# Patient Record
Sex: FEMALE | Race: WHITE | NOT HISPANIC OR LATINO | Employment: OTHER | URBAN - METROPOLITAN AREA
[De-identification: names, ages, dates, MRNs, and addresses within clinical notes are randomized per-mention and may not be internally consistent; named-entity substitution may affect disease eponyms.]

---

## 2017-03-07 ENCOUNTER — OFFICE VISIT (OUTPATIENT)
Dept: ENDOCRINOLOGY | Facility: CLINIC | Age: 67
End: 2017-03-07

## 2017-03-07 VITALS
HEART RATE: 78 BPM | OXYGEN SATURATION: 99 % | WEIGHT: 132 LBS | BODY MASS INDEX: 21.99 KG/M2 | SYSTOLIC BLOOD PRESSURE: 104 MMHG | HEIGHT: 65 IN | DIASTOLIC BLOOD PRESSURE: 64 MMHG

## 2017-03-07 DIAGNOSIS — E78.00 PURE HYPERCHOLESTEROLEMIA: Primary | ICD-10-CM

## 2017-03-07 DIAGNOSIS — E03.9 ACQUIRED HYPOTHYROIDISM: ICD-10-CM

## 2017-03-07 DIAGNOSIS — E55.9 VITAMIN D DEFICIENCY: ICD-10-CM

## 2017-03-07 DIAGNOSIS — E83.51 HYPOCALCEMIA: ICD-10-CM

## 2017-03-07 PROBLEM — Z98.890 H/O COLONOSCOPY: Status: ACTIVE | Noted: 2017-03-07

## 2017-03-07 LAB
25(OH)D3 SERPL-MCNC: 49.5 NG/ML
ALBUMIN SERPL-MCNC: 4.4 G/DL (ref 3.2–4.8)
ALBUMIN/GLOB SERPL: 1.8 G/DL (ref 1.5–2.5)
ALP SERPL-CCNC: 104 U/L (ref 25–100)
ALT SERPL W P-5'-P-CCNC: 22 U/L (ref 7–40)
ANION GAP SERPL CALCULATED.3IONS-SCNC: 5 MMOL/L (ref 3–11)
ARTICHOKE IGE QN: 218 MG/DL (ref 0–130)
AST SERPL-CCNC: 24 U/L (ref 0–33)
BILIRUB SERPL-MCNC: 0.6 MG/DL (ref 0.3–1.2)
BUN BLD-MCNC: 19 MG/DL (ref 9–23)
BUN/CREAT SERPL: 23.8 (ref 7–25)
CALCIUM SPEC-SCNC: 10.3 MG/DL (ref 8.7–10.4)
CHLORIDE SERPL-SCNC: 104 MMOL/L (ref 99–109)
CHOLEST SERPL-MCNC: 341 MG/DL (ref 0–200)
CO2 SERPL-SCNC: 30 MMOL/L (ref 20–31)
CREAT BLD-MCNC: 0.8 MG/DL (ref 0.6–1.3)
GFR SERPL CREATININE-BSD FRML MDRD: 72 ML/MIN/1.73
GLOBULIN UR ELPH-MCNC: 2.4 GM/DL
GLUCOSE BLD-MCNC: 96 MG/DL (ref 70–100)
HDLC SERPL-MCNC: 99 MG/DL (ref 40–60)
POTASSIUM BLD-SCNC: 4.7 MMOL/L (ref 3.5–5.5)
PROT SERPL-MCNC: 6.8 G/DL (ref 5.7–8.2)
SODIUM BLD-SCNC: 139 MMOL/L (ref 132–146)
TRIGL SERPL-MCNC: 97 MG/DL (ref 0–150)
TSH SERPL DL<=0.05 MIU/L-ACNC: 0.69 MIU/ML (ref 0.35–5.35)

## 2017-03-07 PROCEDURE — 99213 OFFICE O/P EST LOW 20 MIN: CPT | Performed by: INTERNAL MEDICINE

## 2017-03-07 PROCEDURE — 82306 VITAMIN D 25 HYDROXY: CPT | Performed by: INTERNAL MEDICINE

## 2017-03-07 PROCEDURE — 80061 LIPID PANEL: CPT | Performed by: INTERNAL MEDICINE

## 2017-03-07 PROCEDURE — 80053 COMPREHEN METABOLIC PANEL: CPT | Performed by: INTERNAL MEDICINE

## 2017-03-07 PROCEDURE — 84443 ASSAY THYROID STIM HORMONE: CPT | Performed by: INTERNAL MEDICINE

## 2017-03-07 RX ORDER — ESTRADIOL 0.03 MG/D
1 FILM, EXTENDED RELEASE TRANSDERMAL 2 TIMES WEEKLY
Qty: 8 PATCH | Refills: 11 | Status: SHIPPED | OUTPATIENT
Start: 2017-03-09 | End: 2017-03-07 | Stop reason: SDUPTHER

## 2017-03-07 RX ORDER — ESTRADIOL 10 UG/1
INSERT VAGINAL
Qty: 36 TABLET | Refills: 1 | Status: SHIPPED | OUTPATIENT
Start: 2017-03-07 | End: 2017-09-12 | Stop reason: SDUPTHER

## 2017-03-07 RX ORDER — LEVOTHYROXINE SODIUM 100 MCG
100 TABLET ORAL DAILY
Qty: 90 TABLET | Refills: 1 | Status: SHIPPED | OUTPATIENT
Start: 2017-03-07 | End: 2018-01-05 | Stop reason: SDUPTHER

## 2017-03-07 RX ORDER — TRETINOIN 0.5 MG/G
CREAM TOPICAL
Refills: 11 | COMMUNITY
Start: 2016-12-20

## 2017-03-07 RX ORDER — ESTRADIOL 0.03 MG/D
1 FILM, EXTENDED RELEASE TRANSDERMAL 2 TIMES WEEKLY
Qty: 24 PATCH | Refills: 3 | Status: SHIPPED | OUTPATIENT
Start: 2017-03-09 | End: 2017-03-10 | Stop reason: SDUPTHER

## 2017-03-07 NOTE — PROGRESS NOTES
Beverley Yoo 66 y.o.  CC:Follow-up and Hypothyroidism    [place]: Follow-up and Hypothyroidism    Some dry skin and hair changes - after stopping estrogen   She was allergic to generic alternative    No Known Allergies    Current Outpatient Prescriptions:   •  SYNTHROID 100 MCG tablet, Take 1 tablet by mouth daily for 365 doses. (Patient taking differently: Take 100 mcg by mouth daily. One tablet QOD alternating with 112mcg QOD), Disp: 365 tablet, Rfl: 0  •  tretinoin (RETIN-A) 0.05 % cream, ANALIA AA HS, Disp: , Rfl: 11  •  VAGIFEM 10 MCG tablet vaginal tablet, INSERT 1 TABLET VAGINALLY THREE TIMES A WEEK, Disp: 12 tablet, Rfl: 5  Patient Active Problem List    Diagnosis   • Menopausal symptoms [N95.1]   • Osteopenia [M85.80]   • Seasonal allergies [J30.2]   • Vitamin D deficiency [E55.9]   • Essential familial hypercholesterolemia [E78.01]   • Fibrocystic breast disease [N60.19]     Overview Note:     right periaureolar breast lump- referred 1/13 for diagnostic mamm, abnormal mamm      10/15- repeat  6 months       • Hypercalcemia [E83.52]     Overview Note:     Last Impression: 08 Sep 2015  check ion ca, pth and vitamin D levels  Nela Kenyon (Endocrinology)       • Hyperlipidemia [E78.5]     Overview Note:     Last Impression: 11 Mar 2015  check flp  Nlea Kenyon (Endocrinology)     • Hypothyroidism [E03.9]     Overview Note:      Last Impression: 08 Sep 2015  check tfts  Nela Kenyon (Endocrinology)     • Joint pain [M25.50]     Overview Note:     KNEE AND ELBOW     • Mammogram abnormal [R92.8]     Overview Note:     2/14 abnormal- additional viewsbenign, recc 6 month f/u views  9/14 - continue 6 month      f/u, 10/15 6 month f/u         Review of Systems   Constitutional: Negative for activity change, appetite change, chills, diaphoresis, fatigue, fever and unexpected weight change.   HENT: Negative for congestion, dental problem, drooling, ear discharge, ear pain, facial swelling,  hearing loss, mouth sores, nosebleeds, postnasal drip, rhinorrhea, sinus pressure, sneezing, sore throat, tinnitus, trouble swallowing and voice change.    Eyes: Negative for photophobia, pain, discharge, redness, itching and visual disturbance.   Respiratory: Negative for apnea, cough, choking, chest tightness, shortness of breath, wheezing and stridor.    Cardiovascular: Negative for chest pain, palpitations and leg swelling.   Gastrointestinal: Negative for abdominal distention, abdominal pain, anal bleeding, blood in stool, constipation, diarrhea, nausea, rectal pain and vomiting.   Endocrine: Negative for cold intolerance, heat intolerance, polydipsia, polyphagia and polyuria.   Genitourinary: Negative for decreased urine volume, difficulty urinating, dysuria, enuresis, flank pain, frequency, genital sores, hematuria and urgency.   Musculoskeletal: Negative for arthralgias, back pain, gait problem, joint swelling, myalgias, neck pain and neck stiffness.   Skin: Negative for color change, pallor, rash and wound.        Dry skin   Skin irritation with adhesive   Allergic/Immunologic: Negative for environmental allergies, food allergies and immunocompromised state.   Neurological: Negative for dizziness, tremors, seizures, syncope, facial asymmetry, speech difficulty, weakness, light-headedness, numbness and headaches.   Hematological: Negative for adenopathy. Does not bruise/bleed easily.   Psychiatric/Behavioral: Negative for agitation, behavioral problems, confusion, decreased concentration, dysphoric mood, hallucinations, self-injury, sleep disturbance and suicidal ideas. The patient is not nervous/anxious and is not hyperactive.      Social History     Social History   • Marital status:      Spouse name: N/A   • Number of children: N/A   • Years of education: N/A     Occupational History   • Not on file.     Social History Main Topics   • Smoking status: Never Smoker   • Smokeless tobacco: Not on file  "  • Alcohol use Yes      Comment: 3 drinks per week   • Drug use: Defer   • Sexual activity: Defer     Other Topics Concern   • Not on file     Social History Narrative     Family History   Problem Relation Age of Onset   • Cancer Paternal Grandfather      BREAST    • Breast cancer Mother    • Breast cancer Sister    • Breast cancer Maternal Grandmother      Visit Vitals   • /64   • Pulse 78   • Ht 65\" (165.1 cm)   • Wt 132 lb (59.9 kg)   • SpO2 99%   • BMI 21.97 kg/m2     Physical Exam   Constitutional: She is oriented to person, place, and time. She appears well-developed and well-nourished.   HENT:   Head: Normocephalic and atraumatic.   Nose: Nose normal.   Mouth/Throat: Oropharynx is clear and moist.   Eyes: Conjunctivae, EOM and lids are normal. Pupils are equal, round, and reactive to light.   Neck: Trachea normal and normal range of motion. Neck supple. Carotid bruit is not present. No tracheal deviation present. No thyroid mass and no thyromegaly present.   Cardiovascular: Normal rate, regular rhythm, normal heart sounds and intact distal pulses.  Exam reveals no gallop and no friction rub.    No murmur heard.  Pulmonary/Chest: Effort normal and breath sounds normal. No respiratory distress. She has no wheezes.   Musculoskeletal: Normal range of motion. She exhibits no edema or deformity.   Lymphadenopathy:     She has no cervical adenopathy.   Neurological: She is alert and oriented to person, place, and time. She has normal reflexes. She displays normal reflexes. No cranial nerve deficit.   Skin: Skin is warm and dry. No rash noted. No cyanosis or erythema. Nails show no clubbing.   Psychiatric: She has a normal mood and affect. Her speech is normal and behavior is normal. Judgment and thought content normal. Cognition and memory are normal.   Nursing note and vitals reviewed.    Results for orders placed or performed in visit on 09/15/16   TSH   Result Value Ref Range    TSH 0.281 (L) 0.350 - 5.350 " mIU/mL   T4, free   Result Value Ref Range    Free T4 1.34 0.89 - 1.76 ng/dL   Comprehensive metabolic panel   Result Value Ref Range    Glucose 82 70 - 100 mg/dL    BUN 18 9 - 23 mg/dL    Creatinine 0.80 0.60 - 1.30 mg/dL    Sodium 138 132 - 146 mmol/L    Potassium 4.5 3.5 - 5.5 mmol/L    Chloride 107 99 - 109 mmol/L    CO2 25.0 20.0 - 31.0 mmol/L    Calcium 9.9 8.7 - 10.4 mg/dL    Total Protein 7.2 5.7 - 8.2 g/dL    Albumin 4.40 3.20 - 4.80 g/dL    ALT (SGPT) 20 7 - 40 U/L    AST (SGOT) 22 0 - 33 U/L    Alkaline Phosphatase 82 25 - 100 U/L    Total Bilirubin 0.6 0.3 - 1.2 mg/dL    eGFR Non African Amer 72 >60 mL/min/1.73    Globulin 2.8 gm/dL    A/G Ratio 1.6 g/dL    BUN/Creatinine Ratio 22.5 7.0 - 25.0    Anion Gap 6.0 3.0 - 11.0 mmol/L   Lipid panel   Result Value Ref Range    Total Cholesterol 286 (H) 0 - 200 mg/dL    Triglycerides 124 0 - 150 mg/dL    HDL Cholesterol 87 (H) 40 - 60 mg/dL    LDL Cholesterol  164 (H) 0 - 130 mg/dL   QuantiFERON TB Client Incubated   Result Value Ref Range    QuantiFERON-TB Gold In Tube Negative Negative    QuantiFERON Criteria Comment     QuantiFERON TB Ag Value 0.04 IU/mL    QuantiFERON Nil Value 0.09 IU/mL    QuantiFERON Mitogen Value >10.00 IU/mL    QFT TB Ag minus Nil Value <0.00 IU/mL    Interpretation Comment      Problem List Items Addressed This Visit        Cardiovascular and Mediastinum    Hyperlipidemia - Primary     Check flp          Relevant Orders    Lipid Panel       Digestive    Vitamin D deficiency     Update vitamin D             Endocrine    Hypothyroidism     Check tfts          Relevant Medications    SYNTHROID 100 MCG tablet    Other Relevant Orders    TSH       Other    Hypocalcemia     Check cmp          Relevant Orders    Comprehensive Metabolic Panel    Vitamin D 25 Hydroxy      Return in about 6 months (around 9/7/2017) for Recheck 30 min .      Sheryl Rivas MA

## 2017-03-10 ENCOUNTER — TELEPHONE (OUTPATIENT)
Dept: ENDOCRINOLOGY | Facility: CLINIC | Age: 67
End: 2017-03-10

## 2017-03-10 RX ORDER — ESTRADIOL 0.03 MG/D
1 PATCH, EXTENDED RELEASE TRANSDERMAL 2 TIMES WEEKLY
Qty: 24 PATCH | Refills: 3 | Status: SHIPPED | OUTPATIENT
Start: 2017-03-13 | End: 2018-11-08 | Stop reason: SDUPTHER

## 2017-03-14 ENCOUNTER — TELEPHONE (OUTPATIENT)
Dept: INTERNAL MEDICINE | Facility: CLINIC | Age: 67
End: 2017-03-14

## 2017-03-14 NOTE — TELEPHONE ENCOUNTER
Gaetano with [facility] was informed the pt has to have brand name Vivelle Dot patch because she is allergic to generic - patch - the adhesive causes a rash and itching

## 2017-03-14 NOTE — TELEPHONE ENCOUNTER
----- Message from Beverley Werner sent at 3/14/2017  4:29 PM EDT -----  Contact: JACKI WITH [facility]  SHE IS CALLING WANTING TO SEE IF IT IS OK TO LET PATIENT HAVE THE GENERIC BRAND OF THE VIVELLE DOT PATCH VERSES THE BRAND NAME. GIVING PATIENT THE GENERIC WOULD SAVE THE PATIENT A LOT OF MONEY. SHE NEEDS A  VERBAL AUTHORIZATION TO DISPENSE THE GENERIC FOR VIVELLE DOT PATCH. YOU CAN REACH THEM BACK [phone] WITH REFERENCE NUMBER [id number]

## 2017-05-23 ENCOUNTER — HOSPITAL ENCOUNTER (OUTPATIENT)
Dept: MAMMOGRAPHY | Facility: HOSPITAL | Age: 67
Discharge: HOME | End: 2017-05-23
Attending: INTERNAL MEDICINE | Admitting: INTERNAL MEDICINE

## 2017-05-23 DIAGNOSIS — R92.8 ABNORMAL MAMMOGRAM: ICD-10-CM

## 2017-05-23 PROCEDURE — G0204 DX MAMMO INCL CAD BI: HCPCS | Performed by: RADIOLOGY

## 2017-05-23 PROCEDURE — G0279 TOMOSYNTHESIS, MAMMO: HCPCS

## 2017-05-23 PROCEDURE — G0279 TOMOSYNTHESIS, MAMMO: HCPCS | Performed by: RADIOLOGY

## 2017-05-23 PROCEDURE — G0204 DX MAMMO INCL CAD BI: HCPCS

## 2017-09-12 ENCOUNTER — OFFICE VISIT (OUTPATIENT)
Dept: ENDOCRINOLOGY | Facility: CLINIC | Age: 67
End: 2017-09-12

## 2017-09-12 ENCOUNTER — TELEPHONE (OUTPATIENT)
Dept: ENDOCRINOLOGY | Facility: CLINIC | Age: 67
End: 2017-09-12

## 2017-09-12 VITALS
WEIGHT: 132 LBS | HEART RATE: 64 BPM | HEIGHT: 65 IN | SYSTOLIC BLOOD PRESSURE: 118 MMHG | BODY MASS INDEX: 21.99 KG/M2 | DIASTOLIC BLOOD PRESSURE: 68 MMHG

## 2017-09-12 DIAGNOSIS — E55.9 VITAMIN D DEFICIENCY: ICD-10-CM

## 2017-09-12 DIAGNOSIS — E78.00 PURE HYPERCHOLESTEROLEMIA: ICD-10-CM

## 2017-09-12 DIAGNOSIS — E03.9 ACQUIRED HYPOTHYROIDISM: ICD-10-CM

## 2017-09-12 DIAGNOSIS — E83.52 HYPERCALCEMIA: Primary | ICD-10-CM

## 2017-09-12 DIAGNOSIS — E83.52 HYPERCALCEMIA: ICD-10-CM

## 2017-09-12 DIAGNOSIS — E78.01 ESSENTIAL FAMILIAL HYPERCHOLESTEROLEMIA: Primary | ICD-10-CM

## 2017-09-12 DIAGNOSIS — M85.89 OTHER SPECIFIED DISORDERS OF BONE DENSITY AND STRUCTURE, MULTIPLE SITES: ICD-10-CM

## 2017-09-12 LAB
25(OH)D3 SERPL-MCNC: 36.2 NG/ML
ALBUMIN SERPL-MCNC: 4.5 G/DL (ref 3.2–4.8)
ALBUMIN/GLOB SERPL: 2 G/DL (ref 1.5–2.5)
ALP SERPL-CCNC: 94 U/L (ref 25–100)
ALT SERPL W P-5'-P-CCNC: 21 U/L (ref 7–40)
ANION GAP SERPL CALCULATED.3IONS-SCNC: 9 MMOL/L (ref 3–11)
ARTICHOKE IGE QN: 203 MG/DL (ref 0–130)
AST SERPL-CCNC: 24 U/L (ref 0–33)
BASOPHILS # BLD AUTO: 0.03 10*3/MM3 (ref 0–0.2)
BASOPHILS NFR BLD AUTO: 0.6 % (ref 0–1)
BILIRUB SERPL-MCNC: 0.8 MG/DL (ref 0.3–1.2)
BUN BLD-MCNC: 15 MG/DL (ref 9–23)
BUN/CREAT SERPL: 18.8 (ref 7–25)
CA-I SERPL ISE-MCNC: 1.38 MMOL/L (ref 1.12–1.32)
CALCIUM SPEC-SCNC: 9.9 MG/DL (ref 8.7–10.4)
CHLORIDE SERPL-SCNC: 102 MMOL/L (ref 99–109)
CHOLEST SERPL-MCNC: 343 MG/DL (ref 0–200)
CO2 SERPL-SCNC: 27 MMOL/L (ref 20–31)
CREAT BLD-MCNC: 0.8 MG/DL (ref 0.6–1.3)
DEPRECATED RDW RBC AUTO: 44.6 FL (ref 37–54)
EOSINOPHIL # BLD AUTO: 0.08 10*3/MM3 (ref 0–0.3)
EOSINOPHIL NFR BLD AUTO: 1.7 % (ref 0–3)
ERYTHROCYTE [DISTWIDTH] IN BLOOD BY AUTOMATED COUNT: 12.7 % (ref 11.3–14.5)
GFR SERPL CREATININE-BSD FRML MDRD: 72 ML/MIN/1.73
GLOBULIN UR ELPH-MCNC: 2.3 GM/DL
GLUCOSE BLD-MCNC: 90 MG/DL (ref 70–100)
HCT VFR BLD AUTO: 44.5 % (ref 34.5–44)
HCV AB SER DONR QL: NORMAL
HDLC SERPL-MCNC: 93 MG/DL (ref 40–60)
HGB BLD-MCNC: 14.8 G/DL (ref 11.5–15.5)
IMM GRANULOCYTES # BLD: 0.01 10*3/MM3 (ref 0–0.03)
IMM GRANULOCYTES NFR BLD: 0.2 % (ref 0–0.6)
LYMPHOCYTES # BLD AUTO: 1.64 10*3/MM3 (ref 0.6–4.8)
LYMPHOCYTES NFR BLD AUTO: 35.3 % (ref 24–44)
MCH RBC QN AUTO: 32 PG (ref 27–31)
MCHC RBC AUTO-ENTMCNC: 33.3 G/DL (ref 32–36)
MCV RBC AUTO: 96.3 FL (ref 80–99)
MONOCYTES # BLD AUTO: 0.63 10*3/MM3 (ref 0–1)
MONOCYTES NFR BLD AUTO: 13.5 % (ref 0–12)
NEUTROPHILS # BLD AUTO: 2.26 10*3/MM3 (ref 1.5–8.3)
NEUTROPHILS NFR BLD AUTO: 48.7 % (ref 41–71)
PLATELET # BLD AUTO: 277 10*3/MM3 (ref 150–450)
PMV BLD AUTO: 10.6 FL (ref 6–12)
POTASSIUM BLD-SCNC: 4.7 MMOL/L (ref 3.5–5.5)
PROT SERPL-MCNC: 6.8 G/DL (ref 5.7–8.2)
RBC # BLD AUTO: 4.62 10*6/MM3 (ref 3.89–5.14)
SODIUM BLD-SCNC: 138 MMOL/L (ref 132–146)
T4 FREE SERPL-MCNC: 1.21 NG/DL (ref 0.89–1.76)
TRIGL SERPL-MCNC: 132 MG/DL (ref 0–150)
TSH SERPL DL<=0.05 MIU/L-ACNC: 0.56 MIU/ML (ref 0.35–5.35)
WBC NRBC COR # BLD: 4.65 10*3/MM3 (ref 3.5–10.8)

## 2017-09-12 PROCEDURE — 82330 ASSAY OF CALCIUM: CPT | Performed by: INTERNAL MEDICINE

## 2017-09-12 PROCEDURE — 84443 ASSAY THYROID STIM HORMONE: CPT | Performed by: INTERNAL MEDICINE

## 2017-09-12 PROCEDURE — 36415 COLL VENOUS BLD VENIPUNCTURE: CPT | Performed by: INTERNAL MEDICINE

## 2017-09-12 PROCEDURE — 80053 COMPREHEN METABOLIC PANEL: CPT | Performed by: INTERNAL MEDICINE

## 2017-09-12 PROCEDURE — 86803 HEPATITIS C AB TEST: CPT | Performed by: INTERNAL MEDICINE

## 2017-09-12 PROCEDURE — 84439 ASSAY OF FREE THYROXINE: CPT | Performed by: INTERNAL MEDICINE

## 2017-09-12 PROCEDURE — 99214 OFFICE O/P EST MOD 30 MIN: CPT | Performed by: INTERNAL MEDICINE

## 2017-09-12 PROCEDURE — 90662 IIV NO PRSV INCREASED AG IM: CPT | Performed by: INTERNAL MEDICINE

## 2017-09-12 PROCEDURE — 80061 LIPID PANEL: CPT | Performed by: INTERNAL MEDICINE

## 2017-09-12 PROCEDURE — 86480 TB TEST CELL IMMUN MEASURE: CPT | Performed by: INTERNAL MEDICINE

## 2017-09-12 PROCEDURE — 82306 VITAMIN D 25 HYDROXY: CPT | Performed by: INTERNAL MEDICINE

## 2017-09-12 PROCEDURE — 85025 COMPLETE CBC W/AUTO DIFF WBC: CPT | Performed by: INTERNAL MEDICINE

## 2017-09-12 PROCEDURE — 90471 IMMUNIZATION ADMIN: CPT | Performed by: INTERNAL MEDICINE

## 2017-09-12 RX ORDER — ESTRADIOL 10 UG/1
INSERT VAGINAL
Qty: 48 TABLET | Refills: 1 | Status: SHIPPED | OUTPATIENT
Start: 2017-09-12 | End: 2018-02-27 | Stop reason: SDUPTHER

## 2017-09-12 NOTE — PROGRESS NOTES
Beverley Yoo 67 y.o.  CC:Follow-up; Hypothyroidism; Hyperlipidemia; and Vitamin D Deficiency (Hypercalcemia)    [place]: Follow-up; Hypothyroidism; Hyperlipidemia; and Vitamin D Deficiency (Hypercalcemia)  energy is good overall  No new c/o   Reviewed preventive care  bp is good  Is following a low fat diet  Is taking low dose vitamin D supplement  No muscle cramps or pain     Allergies   Allergen Reactions   • Estradiol      Generic patch causes itching and rash from adhesive   • Other        Current Outpatient Prescriptions:   •  estradiol (VAGIFEM) 10 MCG tablet vaginal tablet, Insert 1 tablet vaginally 2 - 3 times per week, Disp: 48 tablet, Rfl: 1  •  SYNTHROID 100 MCG tablet, Take 1 tablet by mouth Daily for 365 doses., Disp: 90 tablet, Rfl: 1  •  tretinoin (RETIN-A) 0.05 % cream, ANALIA AA HS, Disp: , Rfl: 11  •  VIVELLE-DOT 0.025 MG/24HR patch, Place 1 patch on the skin 2 (Two) Times a Week. Allergic to generic, Disp: 24 patch, Rfl: 3  Patient Active Problem List    Diagnosis   • H/O colonoscopy [Z98.890]     Overview Note:     Description: diverticulosis- Gem 5/12 repeat 10 years     • Hypocalcemia [E83.51]     Overview Note:     Impression: 03/11/2015 - check cmp  Impression: 09/10/2014 - check ion ca and cmp  is taking ca supplement  normal D levels last visit;      • Menopausal symptoms [N95.1]   • Osteopenia [M85.80]     Overview Note:     Impression: 09/08/2015 - utd with exam- repeat 2016; Description: 2/14- on calcium and vitamin D - repeat in 2 years.  frax score stable     • Seasonal allergies [J30.2]     Overview Note:     Impression: 09/08/2015 - recc [place], otc nasalcort/claritin/etc  recent poor air quality likely culprit;      • Vitamin D deficiency [E55.9]     Overview Note:     Impression: 09/08/2015 - check levels  Impression: 03/11/2015 - update vitamin D  Impression: 03/05/2014 - update vitamin D;      • Essential familial hypercholesterolemia [E78.01]     Overview Note:     Impression:  09/08/2015 - check flp  high ldl but good ratio due to high hdl;      • Fibrocystic breast disease [N60.19]     Overview Note:     right periaureolar breast lump- referred 1/13 for diagnostic mamm, abnormal mamm      10/15- repeat  6 months       • Hypercalcemia [E83.52]     Overview Note:     Last Impression: 08 Sep 2015  check ion ca, pth and vitamin D levels  Nela Kenyon (Endocrinology)       • Hyperlipidemia [E78.5]     Overview Note:     Last Impression: 11 Mar 2015  check flp  Nela Kenyon (Endocrinology)     • Hypothyroidism [E03.9]     Overview Note:      Last Impression: 08 Sep 2015  check tfts  Nela Kenyon (Endocrinology)     • Knee pain [M25.569]     Overview Note:     KNEE AND ELBOW     • Mammogram abnormal [R92.8]     Overview Note:     2/14 abnormal- additional viewsbenign, recc 6 month f/u views  9/14 - continue 6 month      f/u, 10/15 6 month f/u         Review of Systems   Constitutional: Negative for activity change, appetite change, chills, diaphoresis, fatigue, fever and unexpected weight change.   HENT: Negative for congestion, dental problem, drooling, ear discharge, ear pain, facial swelling, hearing loss, mouth sores, nosebleeds, postnasal drip, rhinorrhea, sinus pressure, sneezing, sore throat, tinnitus, trouble swallowing and voice change.    Eyes: Negative for photophobia, pain, discharge, redness, itching and visual disturbance.   Respiratory: Negative for apnea, cough, choking, chest tightness, shortness of breath, wheezing and stridor.    Cardiovascular: Negative for chest pain, palpitations and leg swelling.   Gastrointestinal: Negative for abdominal distention, abdominal pain, anal bleeding, blood in stool, constipation, diarrhea, nausea, rectal pain and vomiting.   Endocrine: Negative for cold intolerance, heat intolerance, polydipsia, polyphagia and polyuria.   Genitourinary: Negative for decreased urine volume, difficulty urinating, dysuria,  "enuresis, flank pain, frequency, genital sores, hematuria and urgency.   Musculoskeletal: Negative for arthralgias, back pain, gait problem, joint swelling, myalgias, neck pain and neck stiffness.   Skin: Negative for color change, pallor, rash and wound.   Allergic/Immunologic: Negative for environmental allergies, food allergies and immunocompromised state.   Neurological: Negative for dizziness, tremors, seizures, syncope, facial asymmetry, speech difficulty, weakness, light-headedness, numbness and headaches.   Hematological: Negative for adenopathy. Does not bruise/bleed easily.   Psychiatric/Behavioral: Negative for agitation, behavioral problems, confusion, decreased concentration, dysphoric mood, hallucinations, self-injury, sleep disturbance and suicidal ideas. The patient is not nervous/anxious and is not hyperactive.      Social History     Social History   • Marital status:      Spouse name: N/A   • Number of children: N/A   • Years of education: N/A     Occupational History   • Not on file.     Social History Main Topics   • Smoking status: Never Smoker   • Smokeless tobacco: Not on file   • Alcohol use Yes      Comment: 3 drinks per week   • Drug use: Defer   • Sexual activity: Defer     Other Topics Concern   • Not on file     Social History Narrative     Family History   Problem Relation Age of Onset   • Cancer Paternal Grandfather      BREAST    • Breast cancer Mother 60   • Breast cancer Sister 60   • Breast cancer Maternal Grandmother 60     /68  Pulse 64  Ht 65\" (165.1 cm)  Wt 132 lb (59.9 kg)  BMI 21.97 kg/m2  Physical Exam   Constitutional: She is oriented to person, place, and time. She appears well-developed and well-nourished.   HENT:   Head: Normocephalic and atraumatic.   Nose: Nose normal.   Mouth/Throat: Oropharynx is clear and moist.   Eyes: Conjunctivae, EOM and lids are normal. Pupils are equal, round, and reactive to light.   Neck: Trachea normal and normal range of " motion. Neck supple. Carotid bruit is not present. No tracheal deviation present. No thyroid mass and no thyromegaly present.   Cardiovascular: Normal rate, regular rhythm, normal heart sounds and intact distal pulses.  Exam reveals no gallop and no friction rub.    No murmur heard.  Pulmonary/Chest: Effort normal and breath sounds normal. No respiratory distress. She has no wheezes.   Musculoskeletal: Normal range of motion. She exhibits no edema or deformity.   Lymphadenopathy:     She has no cervical adenopathy.   Neurological: She is alert and oriented to person, place, and time. She has normal reflexes. She displays normal reflexes. No cranial nerve deficit.   Skin: Skin is warm and dry. No rash noted. No cyanosis or erythema. Nails show no clubbing.   Psychiatric: She has a normal mood and affect. Her speech is normal and behavior is normal. Judgment and thought content normal. Cognition and memory are normal.   Nursing note and vitals reviewed.    Results for orders placed or performed in visit on 03/07/17   TSH   Result Value Ref Range    TSH 0.685 0.350 - 5.350 mIU/mL   Comprehensive Metabolic Panel   Result Value Ref Range    Glucose 96 70 - 100 mg/dL    BUN 19 9 - 23 mg/dL    Creatinine 0.80 0.60 - 1.30 mg/dL    Sodium 139 132 - 146 mmol/L    Potassium 4.7 3.5 - 5.5 mmol/L    Chloride 104 99 - 109 mmol/L    CO2 30.0 20.0 - 31.0 mmol/L    Calcium 10.3 8.7 - 10.4 mg/dL    Total Protein 6.8 5.7 - 8.2 g/dL    Albumin 4.40 3.20 - 4.80 g/dL    ALT (SGPT) 22 7 - 40 U/L    AST (SGOT) 24 0 - 33 U/L    Alkaline Phosphatase 104 (H) 25 - 100 U/L    Total Bilirubin 0.6 0.3 - 1.2 mg/dL    eGFR Non African Amer 72 >60 mL/min/1.73    Globulin 2.4 gm/dL    A/G Ratio 1.8 1.5 - 2.5 g/dL    BUN/Creatinine Ratio 23.8 7.0 - 25.0    Anion Gap 5.0 3.0 - 11.0 mmol/L   Lipid Panel   Result Value Ref Range    Total Cholesterol 341 (H) 0 - 200 mg/dL    Triglycerides 97 0 - 150 mg/dL    HDL Cholesterol 99 (H) 40 - 60 mg/dL    LDL  Cholesterol  218 (H) 0 - 130 mg/dL   Vitamin D 25 Hydroxy   Result Value Ref Range    25 Hydroxy, Vitamin D 49.5 ng/ml     Problem List Items Addressed This Visit        Cardiovascular and Mediastinum    Hyperlipidemia     Check flp          Relevant Orders    Lipid Panel    QuantiFERON TB Gold    RESOLVED: Essential familial hypercholesterolemia - Primary    Relevant Medications    pneumococcal polysaccharide 23-valent (PNEUMOVAX-23) vaccine 0.5 mL    Other Relevant Orders    Comprehensive Metabolic Panel    CBC & Differential    QuantiFERON TB Gold    Hepatitis C Antibody       Digestive    Vitamin D deficiency     Update vitamin D levels          Relevant Orders    Lipid Panel    QuantiFERON TB Gold    Hepatitis C Antibody       Endocrine    Hypothyroidism     Check tfts          Relevant Orders    TSH    T4, Free    CBC & Differential    QuantiFERON TB Gold    Hepatitis C Antibody       Other    Hypercalcemia     Check ion ca and vitamin D          Relevant Orders    Vitamin D 25 Hydroxy    Calcium, Ionized    QuantiFERON TB Gold      Other Visit Diagnoses     Other specified disorders of bone density and structure, multiple sites        Relevant Orders    DEXA Bone Density Axial      utd mamm - yearly f/u   Had tetanus 5 years ago   prevnar today   Return in about 6 months (around 3/12/2018) for Recheck 30 min .      Sheryl Rivas MA  Signed Nela Kenyon MD

## 2017-09-18 LAB
INTERPRETATION: NORMAL
M TB TUBERC IFN-G BLD QL: NEGATIVE
QFT TB AG MINUS NIL VALUE: 0.01 IU/ML
QUANTIFERON CRITERIA: NORMAL
QUANTIFERON MITOGEN VALUE: >10 IU/ML
QUANTIFERON NIL VALUE: 0.05 IU/ML
QUANTIFERON TB AG VALUE: 0.06 IU/ML

## 2017-10-18 ENCOUNTER — HOSPITAL ENCOUNTER (OUTPATIENT)
Dept: BONE DENSITY | Facility: HOSPITAL | Age: 67
Discharge: HOME | End: 2017-10-18
Attending: INTERNAL MEDICINE | Admitting: INTERNAL MEDICINE

## 2017-10-18 PROCEDURE — 77080 DXA BONE DENSITY AXIAL: CPT

## 2018-01-02 RX ORDER — ESTRADIOL 0.03 MG/D
PATCH, EXTENDED RELEASE TRANSDERMAL
Qty: 24 PATCH | Refills: 3 | Status: SHIPPED | OUTPATIENT
Start: 2018-01-02 | End: 2018-05-15 | Stop reason: SDUPTHER

## 2018-01-05 RX ORDER — LEVOTHYROXINE SODIUM 100 MCG
TABLET ORAL
Qty: 90 TABLET | Refills: 1 | Status: SHIPPED | OUTPATIENT
Start: 2018-01-05 | End: 2018-05-15 | Stop reason: SDUPTHER

## 2018-02-27 RX ORDER — ESTRADIOL 10 UG/1
TABLET VAGINAL
Qty: 48 TABLET | Refills: 1 | Status: SHIPPED | OUTPATIENT
Start: 2018-02-27 | End: 2019-01-22 | Stop reason: SDUPTHER

## 2018-03-19 ENCOUNTER — TRANSCRIBE ORDERS (OUTPATIENT)
Dept: ADMINISTRATIVE | Facility: HOSPITAL | Age: 68
End: 2018-03-19

## 2018-03-19 DIAGNOSIS — Z12.31 VISIT FOR SCREENING MAMMOGRAM: Primary | ICD-10-CM

## 2018-05-15 ENCOUNTER — OFFICE VISIT (OUTPATIENT)
Dept: ENDOCRINOLOGY | Facility: CLINIC | Age: 68
End: 2018-05-15

## 2018-05-15 VITALS
HEIGHT: 65 IN | BODY MASS INDEX: 21.99 KG/M2 | WEIGHT: 132 LBS | SYSTOLIC BLOOD PRESSURE: 118 MMHG | DIASTOLIC BLOOD PRESSURE: 68 MMHG

## 2018-05-15 DIAGNOSIS — E78.00 PURE HYPERCHOLESTEROLEMIA: Primary | ICD-10-CM

## 2018-05-15 DIAGNOSIS — M21.612 BUNION OF LEFT FOOT: ICD-10-CM

## 2018-05-15 DIAGNOSIS — E55.9 VITAMIN D DEFICIENCY: ICD-10-CM

## 2018-05-15 DIAGNOSIS — E03.9 ACQUIRED HYPOTHYROIDISM: ICD-10-CM

## 2018-05-15 DIAGNOSIS — E83.52 HYPERCALCEMIA: ICD-10-CM

## 2018-05-15 LAB
25(OH)D3 SERPL-MCNC: 41.8 NG/ML
ALBUMIN SERPL-MCNC: 4.3 G/DL (ref 3.2–4.8)
ALBUMIN/GLOB SERPL: 1.7 G/DL (ref 1.5–2.5)
ALP SERPL-CCNC: 75 U/L (ref 25–100)
ALT SERPL W P-5'-P-CCNC: 20 U/L (ref 7–40)
ANION GAP SERPL CALCULATED.3IONS-SCNC: 4 MMOL/L (ref 3–11)
ARTICHOKE IGE QN: 168 MG/DL (ref 0–130)
AST SERPL-CCNC: 24 U/L (ref 0–33)
BILIRUB SERPL-MCNC: 0.8 MG/DL (ref 0.3–1.2)
BUN BLD-MCNC: 19 MG/DL (ref 9–23)
BUN/CREAT SERPL: 23.8 (ref 7–25)
CA-I SERPL ISE-MCNC: 1.37 MMOL/L (ref 1.12–1.32)
CALCIUM SPEC-SCNC: 9.4 MG/DL (ref 8.7–10.4)
CHLORIDE SERPL-SCNC: 108 MMOL/L (ref 99–109)
CHOLEST SERPL-MCNC: 288 MG/DL (ref 0–200)
CO2 SERPL-SCNC: 29 MMOL/L (ref 20–31)
CREAT BLD-MCNC: 0.8 MG/DL (ref 0.6–1.3)
GFR SERPL CREATININE-BSD FRML MDRD: 72 ML/MIN/1.73
GLOBULIN UR ELPH-MCNC: 2.5 GM/DL
GLUCOSE BLD-MCNC: 93 MG/DL (ref 70–100)
HDLC SERPL-MCNC: 95 MG/DL (ref 40–60)
POTASSIUM BLD-SCNC: 4.6 MMOL/L (ref 3.5–5.5)
PROT SERPL-MCNC: 6.8 G/DL (ref 5.7–8.2)
PTH-INTACT SERPL-MCNC: 87.2 PG/ML (ref 11–80)
SODIUM BLD-SCNC: 141 MMOL/L (ref 132–146)
T4 FREE SERPL-MCNC: 1.28 NG/DL (ref 0.89–1.76)
TRIGL SERPL-MCNC: 108 MG/DL (ref 0–150)
TSH SERPL DL<=0.05 MIU/L-ACNC: 0.53 MIU/ML (ref 0.35–5.35)
VIT B12 BLD-MCNC: 798 PG/ML (ref 211–911)

## 2018-05-15 PROCEDURE — 99214 OFFICE O/P EST MOD 30 MIN: CPT | Performed by: INTERNAL MEDICINE

## 2018-05-15 PROCEDURE — 84443 ASSAY THYROID STIM HORMONE: CPT | Performed by: INTERNAL MEDICINE

## 2018-05-15 PROCEDURE — 80061 LIPID PANEL: CPT | Performed by: INTERNAL MEDICINE

## 2018-05-15 PROCEDURE — 82330 ASSAY OF CALCIUM: CPT | Performed by: INTERNAL MEDICINE

## 2018-05-15 PROCEDURE — 83970 ASSAY OF PARATHORMONE: CPT | Performed by: INTERNAL MEDICINE

## 2018-05-15 PROCEDURE — 82306 VITAMIN D 25 HYDROXY: CPT | Performed by: INTERNAL MEDICINE

## 2018-05-15 PROCEDURE — 80053 COMPREHEN METABOLIC PANEL: CPT | Performed by: INTERNAL MEDICINE

## 2018-05-15 PROCEDURE — 82607 VITAMIN B-12: CPT | Performed by: INTERNAL MEDICINE

## 2018-05-15 PROCEDURE — 84439 ASSAY OF FREE THYROXINE: CPT | Performed by: INTERNAL MEDICINE

## 2018-05-15 RX ORDER — LEVOTHYROXINE SODIUM 100 MCG
100 TABLET ORAL DAILY
Qty: 90 TABLET | Refills: 1 | Status: SHIPPED | OUTPATIENT
Start: 2018-05-15 | End: 2018-11-11 | Stop reason: SDUPTHER

## 2018-05-15 NOTE — PROGRESS NOTES
Beverley Yoo 67 y.o.  CC:Follow-up; Hypothyroidism; Hyperlipidemia; Vitamin D Deficiency; Abnormal Calcium; and bilateral feet areas of pain    [facility]: Follow-up; Hypothyroidism; Hyperlipidemia; Vitamin D Deficiency; Abnormal Calcium; and bilateral feet areas of pain    Is on synthroid 100 mcg daily - energy is good   Is on low fat diet   Calcium level high last ov with  Normal PTH and vitamin D  Right breast tenderness- has mamm  June 4th- she is still on patch- cannot feel lump   Also c/o foot pain - knots on sole of foot   Bunion is painful   Discussed getting opinion from foot and ankle specialist     Allergies   Allergen Reactions   • Estradiol      Generic patch causes itching and rash from adhesive   • Other        Current Outpatient Prescriptions:   •  SYNTHROID 100 MCG tablet, Take 1 tablet by mouth Daily., Disp: 90 tablet, Rfl: 1  •  tretinoin (RETIN-A) 0.05 % cream, ANALIA AA HS, Disp: , Rfl: 11  •  VIVELLE-DOT 0.025 MG/24HR patch, Place 1 patch on the skin 2 (Two) Times a Week. Allergic to generic, Disp: 24 patch, Rfl: 3  •  YUVAFEM 10 MCG tablet vaginal tablet, INSERT 1 TABLET VAGINALLY 2 TO 3 TIMES PER WEEK, Disp: 48 tablet, Rfl: 1  Patient Active Problem List    Diagnosis   • H/O colonoscopy [Z98.890]     Overview Note:     Description: diverticulosis- Gem 5/12 repeat 10 years     • Hypocalcemia [E83.51]     Overview Note:     Impression: 03/11/2015 - check cmp  Impression: 09/10/2014 - check ion ca and cmp  is taking ca supplement  normal D levels last visit;      • Menopausal symptoms [N95.1]   • Osteopenia [M85.80]     Overview Note:     Impression: 09/08/2015 - utd with exam- repeat 2016; Description: 2/14- on calcium and vitamin D - repeat in 2 years.  frax score stable     • Seasonal allergies [J30.2]     Overview Note:     Impression: 09/08/2015 - recc ocean ns, otc nasalcort/claritin/etc  recent poor air quality likely culprit;      • Vitamin D deficiency [E55.9]     Overview Note:     Impression:  09/08/2015 - check levels  Impression: 03/11/2015 - update vitamin D  Impression: 03/05/2014 - update vitamin D;      • Fibrocystic breast disease [N60.19]     Overview Note:     right periaureolar breast lump- referred 1/13 for diagnostic mamm, abnormal mamm      10/15- repeat  6 months       • Hypercalcemia [E83.52]     Overview Note:     Last Impression: 08 Sep 2015  check ion ca, pth and vitamin D levels  Nela Kenyon (Endocrinology)       • Hyperlipidemia [E78.5]     Overview Note:     Last Impression: 11 Mar 2015  check flp  Nela Kenyon (Endocrinology)     • Hypothyroidism [E03.9]     Overview Note:      Last Impression: 08 Sep 2015  check tfts  Nela Kenyon (Endocrinology)     • Knee pain [M25.569]     Overview Note:     KNEE AND ELBOW     • Mammogram abnormal [R92.8]     Overview Note:     2/14 abnormal- additional viewsbenign, recc 6 month f/u views  9/14 - continue 6 month      f/u, 10/15 6 month f/u         Review of Systems   Constitutional: Negative for activity change, appetite change, chills, diaphoresis, fatigue, fever and unexpected weight change.   HENT: Negative for congestion, dental problem, drooling, ear discharge, ear pain, facial swelling, hearing loss, mouth sores, nosebleeds, postnasal drip, rhinorrhea, sinus pressure, sneezing, sore throat, tinnitus, trouble swallowing and voice change.    Eyes: Negative for photophobia, pain, discharge, redness, itching and visual disturbance.   Respiratory: Negative for apnea, cough, choking, chest tightness, shortness of breath, wheezing and stridor.    Cardiovascular: Negative for chest pain, palpitations and leg swelling.   Gastrointestinal: Negative for abdominal distention, abdominal pain, anal bleeding, blood in stool, constipation, diarrhea, nausea, rectal pain and vomiting.   Endocrine: Negative for cold intolerance, heat intolerance, polydipsia, polyphagia and polyuria.   Genitourinary: Negative for decreased  "urine volume, difficulty urinating, dysuria, enuresis, flank pain, frequency, genital sores, hematuria and urgency.   Musculoskeletal: Positive for arthralgias. Negative for back pain, gait problem, joint swelling, myalgias, neck pain and neck stiffness.   Skin: Negative for color change, pallor, rash and wound.   Allergic/Immunologic: Negative for environmental allergies, food allergies and immunocompromised state.   Neurological: Negative for dizziness, tremors, seizures, syncope, facial asymmetry, speech difficulty, weakness, light-headedness, numbness and headaches.   Hematological: Negative for adenopathy. Does not bruise/bleed easily.   Psychiatric/Behavioral: Negative for agitation, behavioral problems, confusion, decreased concentration, dysphoric mood, hallucinations, self-injury, sleep disturbance and suicidal ideas. The patient is not nervous/anxious and is not hyperactive.      Social History     Social History   • Marital status:      Spouse name: N/A   • Number of children: N/A   • Years of education: N/A     Occupational History   • Not on file.     Social History Main Topics   • Smoking status: Never Smoker   • Smokeless tobacco: Never Used   • Alcohol use Yes      Comment: 1 glass of wine 5 days per week   • Drug use: Unknown   • Sexual activity: Defer     Other Topics Concern   • Not on file     Social History Narrative   • No narrative on file     Family History   Problem Relation Age of Onset   • Cancer Paternal Grandfather      BREAST    • Breast cancer Mother 60   • Breast cancer Sister 60   • Breast cancer Maternal Grandmother 60     /69   Ht 163.8 cm (64.5\")   Wt 59.9 kg (132 lb)   BMI 22.31 kg/m²   Physical Exam   Constitutional: She is oriented to person, place, and time. She appears well-developed and well-nourished.   HENT:   Head: Normocephalic and atraumatic.   Nose: Nose normal.   Mouth/Throat: Oropharynx is clear and moist.   Eyes: Conjunctivae, EOM and lids are " normal. Pupils are equal, round, and reactive to light.   Neck: Trachea normal and normal range of motion. Neck supple. Carotid bruit is not present. No tracheal deviation present. No thyroid mass and no thyromegaly present.   Cardiovascular: Normal rate, regular rhythm, normal heart sounds and intact distal pulses.  Exam reveals no gallop and no friction rub.    No murmur heard.  Pulmonary/Chest: Effort normal and breath sounds normal. No respiratory distress. She has no wheezes.   Musculoskeletal: Normal range of motion. She exhibits no edema or deformity.   Right gt toe bunion    Lymphadenopathy:     She has no cervical adenopathy.   Neurological: She is alert and oriented to person, place, and time. She has normal reflexes. She displays normal reflexes. No cranial nerve deficit.   Skin: Skin is warm and dry. No rash noted. No cyanosis or erythema. Nails show no clubbing.   Psychiatric: She has a normal mood and affect. Her speech is normal and behavior is normal. Judgment and thought content normal. Cognition and memory are normal.   Nursing note and vitals reviewed.    Results for orders placed or performed in visit on 09/12/17   TSH   Result Value Ref Range    TSH 0.556 0.350 - 5.350 mIU/mL   T4, Free   Result Value Ref Range    Free T4 1.21 0.89 - 1.76 ng/dL   Comprehensive Metabolic Panel   Result Value Ref Range    Glucose 90 70 - 100 mg/dL    BUN 15 9 - 23 mg/dL    Creatinine 0.80 0.60 - 1.30 mg/dL    Sodium 138 132 - 146 mmol/L    Potassium 4.7 3.5 - 5.5 mmol/L    Chloride 102 99 - 109 mmol/L    CO2 27.0 20.0 - 31.0 mmol/L    Calcium 9.9 8.7 - 10.4 mg/dL    Total Protein 6.8 5.7 - 8.2 g/dL    Albumin 4.50 3.20 - 4.80 g/dL    ALT (SGPT) 21 7 - 40 U/L    AST (SGOT) 24 0 - 33 U/L    Alkaline Phosphatase 94 25 - 100 U/L    Total Bilirubin 0.8 0.3 - 1.2 mg/dL    eGFR Non African Amer 72 >60 mL/min/1.73    Globulin 2.3 gm/dL    A/G Ratio 2.0 1.5 - 2.5 g/dL    BUN/Creatinine Ratio 18.8 7.0 - 25.0    Anion Gap  9.0 3.0 - 11.0 mmol/L   Lipid Panel   Result Value Ref Range    Total Cholesterol 343 (H) 0 - 200 mg/dL    Triglycerides 132 0 - 150 mg/dL    HDL Cholesterol 93 (H) 40 - 60 mg/dL    LDL Cholesterol  203 (H) 0 - 130 mg/dL   Vitamin D 25 Hydroxy   Result Value Ref Range    25 Hydroxy, Vitamin D 36.2 ng/ml   Calcium, Ionized   Result Value Ref Range    Ionized Calcium 1.38 (H) 1.12 - 1.32 mmol/L   Hepatitis C Antibody   Result Value Ref Range    Hepatitis C Ab Non-Reactive Non-Reactive   CBC Auto Differential   Result Value Ref Range    WBC 4.65 3.50 - 10.80 10*3/mm3    RBC 4.62 3.89 - 5.14 10*6/mm3    Hemoglobin 14.8 11.5 - 15.5 g/dL    Hematocrit 44.5 (H) 34.5 - 44.0 %    MCV 96.3 80.0 - 99.0 fL    MCH 32.0 (H) 27.0 - 31.0 pg    MCHC 33.3 32.0 - 36.0 g/dL    RDW 12.7 11.3 - 14.5 %    RDW-SD 44.6 37.0 - 54.0 fl    MPV 10.6 6.0 - 12.0 fL    Platelets 277 150 - 450 10*3/mm3    Neutrophil % 48.7 41.0 - 71.0 %    Lymphocyte % 35.3 24.0 - 44.0 %    Monocyte % 13.5 (H) 0.0 - 12.0 %    Eosinophil % 1.7 0.0 - 3.0 %    Basophil % 0.6 0.0 - 1.0 %    Immature Grans % 0.2 0.0 - 0.6 %    Neutrophils, Absolute 2.26 1.50 - 8.30 10*3/mm3    Lymphocytes, Absolute 1.64 0.60 - 4.80 10*3/mm3    Monocytes, Absolute 0.63 0.00 - 1.00 10*3/mm3    Eosinophils, Absolute 0.08 0.00 - 0.30 10*3/mm3    Basophils, Absolute 0.03 0.00 - 0.20 10*3/mm3    Immature Grans, Absolute 0.01 0.00 - 0.03 10*3/mm3   QuantiFERON TB Client Incubated   Result Value Ref Range    QuantiFERON-TB Gold In Tube Negative Negative    QuantiFERON Criteria Comment     QuantiFERON TB Ag Value 0.06 IU/mL    QuantiFERON Nil Value 0.05 IU/mL    QuantiFERON Mitogen Value >10.00 IU/mL    QFT TB Ag minus Nil Value 0.01 IU/mL    Interpretation Comment      Problem List Items Addressed This Visit        Cardiovascular and Mediastinum    Hyperlipidemia - Primary     Check flp          Relevant Orders    Comprehensive Metabolic Panel    Lipid Panel    Vitamin B12    QuantiFERON TB  Gold       Digestive    Vitamin D deficiency     Update vitamin D level          Relevant Orders    QuantiFERON TB Gold       Endocrine    Hypothyroidism     Check tft          Relevant Medications    SYNTHROID 100 MCG tablet    Other Relevant Orders    TSH    T4, Free    QuantiFERON TB Gold       Musculoskeletal and Integument    Bunion of left foot     Refer opinion ortho          Relevant Orders    Vitamin B12    Ambulatory Referral to Orthopedic Surgery       Other    Hypercalcemia     Check ion ca and vitamin D, PTH          Relevant Orders    Calcium, Ionized    PTH, Intact    Vitamin D 25 Hydroxy      Other Visit Diagnoses    None.       Return in about 6 months (around 11/15/2018) for Recheck 30 min .    Sheryl Rivas MA  Signed Nela Kenyon MD

## 2018-05-22 PROBLEM — E21.0 PRIMARY HYPERPARATHYROIDISM: Status: ACTIVE | Noted: 2018-05-22

## 2018-06-04 ENCOUNTER — HOSPITAL ENCOUNTER (OUTPATIENT)
Dept: MAMMOGRAPHY | Facility: HOSPITAL | Age: 68
Discharge: HOME | End: 2018-06-04
Attending: INTERNAL MEDICINE | Admitting: INTERNAL MEDICINE

## 2018-06-04 DIAGNOSIS — Z12.31 VISIT FOR SCREENING MAMMOGRAM: ICD-10-CM

## 2018-06-04 PROCEDURE — 77063 BREAST TOMOSYNTHESIS BI: CPT

## 2018-06-04 PROCEDURE — 77067 SCR MAMMO BI INCL CAD: CPT | Performed by: RADIOLOGY

## 2018-06-04 PROCEDURE — 77067 SCR MAMMO BI INCL CAD: CPT

## 2018-06-04 PROCEDURE — 77063 BREAST TOMOSYNTHESIS BI: CPT | Performed by: RADIOLOGY

## 2018-06-20 ENCOUNTER — OFFICE VISIT (OUTPATIENT)
Dept: ORTHOPEDIC SURGERY | Facility: CLINIC | Age: 68
End: 2018-06-20

## 2018-06-20 VITALS — WEIGHT: 126.32 LBS | HEIGHT: 65 IN | OXYGEN SATURATION: 96 % | HEART RATE: 80 BPM | BODY MASS INDEX: 21.05 KG/M2

## 2018-06-20 DIAGNOSIS — M72.2 PLANTAR FASCIAL FIBROMATOSIS OF RIGHT FOOT: ICD-10-CM

## 2018-06-20 DIAGNOSIS — M20.22 ACQUIRED HALLUX RIGIDUS OF LEFT FOOT: ICD-10-CM

## 2018-06-20 DIAGNOSIS — M79.672 LEFT FOOT PAIN: Primary | ICD-10-CM

## 2018-06-20 PROCEDURE — 99203 OFFICE O/P NEW LOW 30 MIN: CPT | Performed by: ORTHOPAEDIC SURGERY

## 2018-06-20 NOTE — PROGRESS NOTES
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  She has a 2-3 year history of increasing pain in the left great toe.  She notes that the toe has gotten bigger and stiffer.  She is wondering if it is a bunion.  She does not have any family history of similar problems.  She does not remember any injury to the great toe.  She is very active.  She reports that the toe does not stop her activity, but she wants to know what to do with it to help keep it from getting worse and to make it more comfortable.  She also has a history of a plantar fibroma on the right foot, and had one on the left foot previously, she reports they do not hurt at present.  She has had custom orthotics in the past for the fibromas, she has them in her athletic shoes.  The left one does not have a turf toe plate.    Current Outpatient Prescriptions on File Prior to Visit   Medication Sig Dispense Refill   • SYNTHROID 100 MCG tablet Take 1 tablet by mouth Daily. 90 tablet 1   • tretinoin (RETIN-A) 0.05 % cream ANALIA AA HS  11   • VIVELLE-DOT 0.025 MG/24HR patch Place 1 patch on the skin 2 (Two) Times a Week. Allergic to generic 24 patch 3   • YUVAFEM 10 MCG tablet vaginal tablet INSERT 1 TABLET VAGINALLY 2 TO 3 TIMES PER WEEK 48 tablet 1     No current facility-administered medications on file prior to visit.       Allergies   Allergen Reactions   • Estradiol      Generic patch causes itching and rash from adhesive   • Levothyroxine Other (See Comments)     Ineffective     • Other       Past Medical History:   Diagnosis Date   • Encounter for screening colonoscopy     diverticulosis- Gem  repeat 10 years   • Encounter for screening for osteoporosis     epeat  "dexa 2015 Last Impression: 18 Apr 2014  dexa 2/12 low bone density  Nela Kenyon     Past Surgical History:   Procedure Laterality Date   • BREAST BIOPSY Bilateral     Stereo - benign   • BREAST BIOPSY Bilateral     benign   • BREAST EXCISIONAL BIOPSY Right     benign   • DENTAL PROCEDURE      HISTORY OF DENTAL SURGERY    • HYSTERECTOMY      WITH REMOVAL OF BOTH OVARIES     Family History   Problem Relation Age of Onset   • Cancer Paternal Grandfather         BREAST    • Breast cancer Mother 60   • Breast cancer Sister 60   • Breast cancer Maternal Grandmother 60   • Cancer Father       Social History     Social History   • Marital status:      Spouse name: N/A   • Number of children: N/A   • Years of education: N/A     Occupational History   • Not on file.     Social History Main Topics   • Smoking status: Never Smoker   • Smokeless tobacco: Never Used   • Alcohol use Yes      Comment: 1 glass of wine 5 days per week   • Drug use: Unknown   • Sexual activity: Defer     Other Topics Concern   • Not on file     Social History Narrative   • No narrative on file        Review of Systems   Constitutional: Negative.    HENT: Negative.    Eyes: Negative.    Respiratory: Negative.    Cardiovascular: Negative.    Gastrointestinal: Negative.    Endocrine: Negative.    Genitourinary: Negative.    Musculoskeletal: Positive for arthralgias (foot pain).   Skin: Negative.    Allergic/Immunologic: Negative.    Neurological: Negative.    Hematological: Negative.    Psychiatric/Behavioral: Negative.        The following portions of the patient's history were reviewed and updated as appropriate: allergies, current medications, past family history, past medical history, past social history, past surgical history and problem list.    Physical Exam:   Pulse 80   Ht 165 cm (64.96\")   Wt 57.3 kg (126 lb 5.2 oz)   SpO2 96%   BMI 21.05 kg/m²   GENERAL: Body habitus: normal weight for height    Lower extremity edema: " Left: none; Right: none    Varicose veins:  Left: mild; Right: mild    Gait: normal     Mental Status:  awake and alert; oriented to person, place, and time    Voice:  clear  SKIN:  Normal and warm and dry    Hair Growth:  Right:normal; Left:  normal  NAILS: Toenails: normal  HEENT: Head: Normocephalic, atraumatic,  without obvious abnormality.  eye: normal external eye, no icterus  ears: normal external ears  nose: normal external nose  pharynx: dental hygiene adequate  PULM:  Repiratory effort normal  CV:  Dorsalis Pedis:  Right: 2+; Left:2+    Posterior Tibial: Right:2+; Left:2+    Capillary Refill:  Brisk  MSK:  Hand:sensation intact      Tibia:  Right:  non tender; Left:  non tender      Ankle:  Right: non tender, ROM  normal and symmetric and motor function  normal; Left:  non tender, ROM  normal and symmetric and motor function  normal      Foot:  Right:  Small plantar fibroma in the middle of the medial cord of the plantar fascia, nontender; Left:  Mildly tender in the first metatarsal phalangeal joint, moderately stiff with about 15-20° dorsiflexion plantarflexion, prominent dorsal osteophytes, no other tenderness      NEURO: Heel Walking:  Right:  normal; Left:  normal    Toe Walking:  Right:  normal; Left:  normal she reports only mild pain in the left great toe    Mon[place]-Chandler 5.07 monofilament test: normal    Lower extremity sensation: intact     Reflexes:  Biceps:  Right:  1+; Left:  1+           Quads:  Right:  2+; Left:  2+           Ankle:  Right:  1+; Left:  1+      Calf Atrophy:none    Motor Function: all 5/5         Medical Decision Making    Data Review:   ordered and reviewed x-rays today    Assessment and Plan/ Diagnosis/Treatment options:   1.Acquired hallux rigidus of left foot  I explained that she does not have a bunion, she has hallux rigidus.     I explained this is arthritis of the big toe.  It is commonly genetic, but may also occur after trauma (such as a turf toe injury).  It is  "often bilateral.  I explained that arthritis by definition is a loss of cartilage.  We do not make cartilage in our bodies as adults, once it begins to wear out it will  continue to wear out.  As this happens, the body puts extra bone around the joint.  We call this osteophyte formation- the common term is \"spur\".  However, this extra bone is not like a thorn sticking into the tissue causing pain, it is merely a marker that indicates the joint has lost cartilage.      I explained that treatment is stepwise.  We do not have any way to put cartilage back into joints.      Conservative treatment: 1. Turf toe orthotics.  2. NSAID (OTC Aleve, Advil, etc) when painful.  3. Glucosamine and chondroitin might help. 4. Cortisone injection- the injection can be done every 6 months if it helps.  I gave her a prescription to have a turf toe plate added to the left orthotic.    Surgery is reserved for failure of conservative treatment.  There are 2 surgeries that I  generally use.  They are both \"salvage\" procedures, as we cannot make new cartilage. For patients who have some cartilage remaining I do a cheilectomy and osteotomy.  This only helps pain about 75% of the time.  For a failure of the cheilectomy and osteotomy or for a patient with very little cartilage in the joint I do a fusion of the 1st MTPJ or the new Cartiva procedure.      She is going to have the orthotic adjusted, and manage her symptoms.  She does not think it's painful enough right now for injection.  I will be happy to see her any time    2.  Plantar fibromatosis, asymptomatic, no signs of Dupuytren's in the hand                      "

## 2018-06-21 PROBLEM — M72.2 PLANTAR FASCIAL FIBROMATOSIS OF RIGHT FOOT: Status: ACTIVE | Noted: 2018-06-21

## 2018-06-21 PROBLEM — M20.22 ACQUIRED HALLUX RIGIDUS OF LEFT FOOT: Status: ACTIVE | Noted: 2018-06-21

## 2018-08-16 ENCOUNTER — OFFICE VISIT (OUTPATIENT)
Dept: ENDOCRINOLOGY | Facility: CLINIC | Age: 68
End: 2018-08-16

## 2018-08-16 VITALS
OXYGEN SATURATION: 98 % | DIASTOLIC BLOOD PRESSURE: 80 MMHG | BODY MASS INDEX: 22.49 KG/M2 | SYSTOLIC BLOOD PRESSURE: 174 MMHG | WEIGHT: 135 LBS | HEART RATE: 74 BPM

## 2018-08-16 DIAGNOSIS — E21.0 PRIMARY HYPERPARATHYROIDISM (HCC): ICD-10-CM

## 2018-08-16 DIAGNOSIS — E03.9 ACQUIRED HYPOTHYROIDISM: ICD-10-CM

## 2018-08-16 DIAGNOSIS — I49.3 FREQUENT UNIFOCAL PVCS: Primary | ICD-10-CM

## 2018-08-16 LAB
ALBUMIN SERPL-MCNC: 4.35 G/DL (ref 3.2–4.8)
ALBUMIN/GLOB SERPL: 1.6 G/DL (ref 1.5–2.5)
ALP SERPL-CCNC: 88 U/L (ref 25–100)
ALT SERPL W P-5'-P-CCNC: 20 U/L (ref 7–40)
ANION GAP SERPL CALCULATED.3IONS-SCNC: 4 MMOL/L (ref 3–11)
AST SERPL-CCNC: 24 U/L (ref 0–33)
BASOPHILS # BLD AUTO: 0.02 10*3/MM3 (ref 0–0.2)
BASOPHILS NFR BLD AUTO: 0.4 % (ref 0–1)
BILIRUB SERPL-MCNC: 0.7 MG/DL (ref 0.3–1.2)
BUN BLD-MCNC: 17 MG/DL (ref 9–23)
BUN/CREAT SERPL: 21.3 (ref 7–25)
CALCIUM SPEC-SCNC: 9.8 MG/DL (ref 8.7–10.4)
CHLORIDE SERPL-SCNC: 104 MMOL/L (ref 99–109)
CK SERPL-CCNC: 58 U/L (ref 26–174)
CO2 SERPL-SCNC: 29 MMOL/L (ref 20–31)
CREAT BLD-MCNC: 0.8 MG/DL (ref 0.6–1.3)
DEPRECATED RDW RBC AUTO: 46.2 FL (ref 37–54)
EOSINOPHIL # BLD AUTO: 0.06 10*3/MM3 (ref 0–0.3)
EOSINOPHIL NFR BLD AUTO: 1.3 % (ref 0–3)
ERYTHROCYTE [DISTWIDTH] IN BLOOD BY AUTOMATED COUNT: 13 % (ref 11.3–14.5)
GFR SERPL CREATININE-BSD FRML MDRD: 72 ML/MIN/1.73
GLOBULIN UR ELPH-MCNC: 2.8 GM/DL
GLUCOSE BLD-MCNC: 93 MG/DL (ref 70–100)
HCT VFR BLD AUTO: 44.6 % (ref 34.5–44)
HGB BLD-MCNC: 14.5 G/DL (ref 11.5–15.5)
IMM GRANULOCYTES # BLD: 0.01 10*3/MM3 (ref 0–0.03)
IMM GRANULOCYTES NFR BLD: 0.2 % (ref 0–0.6)
LYMPHOCYTES # BLD AUTO: 1.66 10*3/MM3 (ref 0.6–4.8)
LYMPHOCYTES NFR BLD AUTO: 36.4 % (ref 24–44)
MCH RBC QN AUTO: 31.8 PG (ref 27–31)
MCHC RBC AUTO-ENTMCNC: 32.5 G/DL (ref 32–36)
MCV RBC AUTO: 97.8 FL (ref 80–99)
MONOCYTES # BLD AUTO: 0.58 10*3/MM3 (ref 0–1)
MONOCYTES NFR BLD AUTO: 12.7 % (ref 0–12)
NEUTROPHILS # BLD AUTO: 2.24 10*3/MM3 (ref 1.5–8.3)
NEUTROPHILS NFR BLD AUTO: 49.2 % (ref 41–71)
PLATELET # BLD AUTO: 270 10*3/MM3 (ref 150–450)
PMV BLD AUTO: 11.5 FL (ref 6–12)
POTASSIUM BLD-SCNC: 4.6 MMOL/L (ref 3.5–5.5)
PROT SERPL-MCNC: 7.1 G/DL (ref 5.7–8.2)
RBC # BLD AUTO: 4.56 10*6/MM3 (ref 3.89–5.14)
SODIUM BLD-SCNC: 137 MMOL/L (ref 132–146)
TROPONIN I SERPL-MCNC: <0.006 NG/ML
TSH SERPL DL<=0.05 MIU/L-ACNC: 0.56 MIU/ML (ref 0.35–5.35)
WBC NRBC COR # BLD: 4.56 10*3/MM3 (ref 3.5–10.8)

## 2018-08-16 PROCEDURE — 93000 ELECTROCARDIOGRAM COMPLETE: CPT | Performed by: INTERNAL MEDICINE

## 2018-08-16 PROCEDURE — 82550 ASSAY OF CK (CPK): CPT | Performed by: INTERNAL MEDICINE

## 2018-08-16 PROCEDURE — 80053 COMPREHEN METABOLIC PANEL: CPT | Performed by: INTERNAL MEDICINE

## 2018-08-16 PROCEDURE — 84443 ASSAY THYROID STIM HORMONE: CPT | Performed by: INTERNAL MEDICINE

## 2018-08-16 PROCEDURE — 99214 OFFICE O/P EST MOD 30 MIN: CPT | Performed by: INTERNAL MEDICINE

## 2018-08-16 PROCEDURE — 85025 COMPLETE CBC W/AUTO DIFF WBC: CPT | Performed by: INTERNAL MEDICINE

## 2018-08-16 PROCEDURE — 84484 ASSAY OF TROPONIN QUANT: CPT | Performed by: INTERNAL MEDICINE

## 2018-08-16 NOTE — ASSESSMENT & PLAN NOTE
Check lab work, echo and holter   Cardio referral - start beta blocker due to higher bp   Samples bystolic 5 mg - take 1/2 daily with f/u in 1-2 weeks

## 2018-08-16 NOTE — ASSESSMENT & PLAN NOTE
Repeat total ca- look at assoc between calcium and pvcs- may be indication for correction surgically

## 2018-08-21 ENCOUNTER — HOSPITAL ENCOUNTER (OUTPATIENT)
Dept: CARDIOLOGY | Facility: HOSPITAL | Age: 68
Discharge: HOME | End: 2018-08-21
Attending: INTERNAL MEDICINE | Admitting: INTERNAL MEDICINE

## 2018-08-21 VITALS — BODY MASS INDEX: 23.05 KG/M2 | WEIGHT: 135 LBS | HEIGHT: 64 IN

## 2018-08-21 LAB
BH CV ECHO MEAS - AO ROOT AREA (BSA CORRECTED): 1.5
BH CV ECHO MEAS - AO ROOT AREA: 4.7 CM^2
BH CV ECHO MEAS - AO ROOT DIAM: 2.5 CM
BH CV ECHO MEAS - BSA(HAYCOCK): 1.7 M^2
BH CV ECHO MEAS - BSA: 1.7 M^2
BH CV ECHO MEAS - BZI_BMI: 23.2 KILOGRAMS/M^2
BH CV ECHO MEAS - BZI_METRIC_HEIGHT: 162.6 CM
BH CV ECHO MEAS - BZI_METRIC_WEIGHT: 61.2 KG
BH CV ECHO MEAS - EDV(CUBED): 91.5 ML
BH CV ECHO MEAS - EDV(MOD-SP2): 38 ML
BH CV ECHO MEAS - EDV(MOD-SP4): 53 ML
BH CV ECHO MEAS - EDV(TEICH): 92.8 ML
BH CV ECHO MEAS - EF(CUBED): 86.7 %
BH CV ECHO MEAS - EF(MOD-SP2): 71.1 %
BH CV ECHO MEAS - EF(MOD-SP4): 86.8 %
BH CV ECHO MEAS - EF(TEICH): 80.5 %
BH CV ECHO MEAS - ESV(CUBED): 12.1 ML
BH CV ECHO MEAS - ESV(MOD-SP2): 11 ML
BH CV ECHO MEAS - ESV(MOD-SP4): 7 ML
BH CV ECHO MEAS - ESV(TEICH): 18.1 ML
BH CV ECHO MEAS - FS: 49 %
BH CV ECHO MEAS - IVS/LVPW: 1
BH CV ECHO MEAS - IVSD: 0.83 CM
BH CV ECHO MEAS - LA DIMENSION: 3.8 CM
BH CV ECHO MEAS - LA/AO: 1.6
BH CV ECHO MEAS - LV DIASTOLIC VOL/BSA (35-75): 32 ML/M^2
BH CV ECHO MEAS - LV MASS(C)D: 115.6 GRAMS
BH CV ECHO MEAS - LV MASS(C)DI: 69.9 GRAMS/M^2
BH CV ECHO MEAS - LV SYSTOLIC VOL/BSA (12-30): 4.2 ML/M^2
BH CV ECHO MEAS - LVIDD: 4.5 CM
BH CV ECHO MEAS - LVIDS: 2.3 CM
BH CV ECHO MEAS - LVLD AP2: 7.5 CM
BH CV ECHO MEAS - LVLD AP4: 7.7 CM
BH CV ECHO MEAS - LVLS AP2: 5.2 CM
BH CV ECHO MEAS - LVLS AP4: 5.5 CM
BH CV ECHO MEAS - LVPWD: 0.79 CM
BH CV ECHO MEAS - MV A MAX VEL: 93.3 CM/SEC
BH CV ECHO MEAS - MV E MAX VEL: 92.8 CM/SEC
BH CV ECHO MEAS - MV E/A: 0.99
BH CV ECHO MEAS - PULM A REVS VEL: 31.6 CM/SEC
BH CV ECHO MEAS - PULM DIAS VEL: 29.1 CM/SEC
BH CV ECHO MEAS - PULM S/D: 1.9
BH CV ECHO MEAS - PULM SYS VEL: 56.8 CM/SEC
BH CV ECHO MEAS - RAP SYSTOLE: 3 MMHG
BH CV ECHO MEAS - RVSP: 19 MMHG
BH CV ECHO MEAS - SI(CUBED): 48 ML/M^2
BH CV ECHO MEAS - SI(MOD-SP2): 16.3 ML/M^2
BH CV ECHO MEAS - SI(MOD-SP4): 27.8 ML/M^2
BH CV ECHO MEAS - SI(TEICH): 45.1 ML/M^2
BH CV ECHO MEAS - SV(CUBED): 79.4 ML
BH CV ECHO MEAS - SV(MOD-SP2): 27 ML
BH CV ECHO MEAS - SV(MOD-SP4): 46 ML
BH CV ECHO MEAS - SV(TEICH): 74.7 ML
BH CV ECHO MEAS - TAPSE (>1.6): 1.3 CM2
BH CV ECHO MEAS - TR MAX PG: 16
BH CV ECHO MEAS - TR MAX VEL: 200.6 CM/SEC
BH CV VAS BP RIGHT ARM: NORMAL MMHG
BH CV XLRA - RV BASE: 3.2 CM
BH CV XLRA - RV LENGTH: 6.9 CM
BH CV XLRA - RV MID: 2.6 CM
BH CV XLRA - TDI S': 9.1 CM/SEC
LEFT ATRIUM VOLUME INDEX: 15 ML/M2
MAXIMAL PREDICTED HEART RATE: 153 BPM
STRESS TARGET HR: 130 BPM

## 2018-08-21 PROCEDURE — 93306 TTE W/DOPPLER COMPLETE: CPT

## 2018-08-21 PROCEDURE — 93306 TTE W/DOPPLER COMPLETE: CPT | Performed by: INTERNAL MEDICINE

## 2018-09-09 NOTE — PROGRESS NOTES
"Beverley Yoo  1950  PCP: Nela Kenyon MD    SUBJECTIVE:   Beverley Yoo is a 68 y.o. female seen for a consultation visit regarding the following:     Chief Complaint:   Chief Complaint   Patient presents with   • Irregular Heart Beat          Consultation is requested by Nela Kenyon MD for evaluation of Irregular Heart Beat        History:    Patient is a 68 year old female with a history of hypothyroidism that presents today in consultation regarding frequent PVCs and palpitations. She mentioned to her endocrinologist that she had been having palpitations for several weeks and EKG at that time revealed frequent PVCs. Her symptoms began back in the middle of July and she would feel a skipped beat every 3 heart beats. She had no other symptoms and was able to do all activity as she wanted. Symptoms improved with exercise. She describes the sensation as \"missed beats\". She then wore a holter for 48 hours that revealed less than 2% PACs (which she did feel) and <.1% PVCs. She stated her symptoms have now all but resolved and she only feels the occasional skipped beat.     Cardiac PMH: (Old records have been reviewed and summarized below)    1. PACs  2. PVCs   2. Echo 8/2018 w/ Normal LVEF   3. Holter- 7 beat run of Atach; PAC w/ total 4700 over 48 hours (2.2); PVCs <0.1%     Past Medical History, Past Surgical History, Family history, Social History, and Medications were all reviewed with the patient today and updated as necessary.       Current Outpatient Prescriptions:   •  SYNTHROID 100 MCG tablet, Take 1 tablet by mouth Daily., Disp: 90 tablet, Rfl: 1  •  tretinoin (RETIN-A) 0.05 % cream, ANALIA AA , Disp: , Rfl: 11  •  VIVELLE-DOT 0.025 MG/24HR patch, Place 1 patch on the skin 2 (Two) Times a Week. Allergic to generic, Disp: 24 patch, Rfl: 3  •  YUVAFEM 10 MCG tablet vaginal tablet, INSERT 1 TABLET VAGINALLY 2 TO 3 TIMES PER WEEK, Disp: 48 tablet, Rfl: 1    No Known Allergies      Past " "Medical History:   Diagnosis Date   • Abnormal ECG 8/16/18   • Arrhythmia 7/25/18    PVCs   • Encounter for screening colonoscopy     diverticulosis- Gem 5/12 repeat 10 years   • Encounter for screening for osteoporosis     epeat dexa 2015 Last Impression: 18 Apr 2014  dexa 2/12 low bone density  Nela Kenyon   • Hyperlipidemia ~1980   • Plantar fascial fibromatosis of right foot      Past Surgical History:   Procedure Laterality Date   • BREAST BIOPSY Bilateral     Stereo - benign   • BREAST BIOPSY Bilateral     benign   • BREAST EXCISIONAL BIOPSY Right     benign   • DENTAL PROCEDURE      HISTORY OF DENTAL SURGERY    • HYSTERECTOMY      WITH REMOVAL OF BOTH OVARIES     Family History   Problem Relation Age of Onset   • Cancer Paternal Grandfather         BREAST    • Breast cancer Mother 60   • Breast cancer Sister 60   • Breast cancer Maternal Grandmother 60   • Cancer Father    • Anemia Father    • Hyperlipidemia Father      Social History   Substance Use Topics   • Smoking status: Never Smoker   • Smokeless tobacco: Never Used   • Alcohol use Yes     4 - 5 Glasses of wine per week      Comment: 1 glass of wine 5 days per week       ROS:  Review of Systems - Negative except palpitations        PHYSICAL EXAM:   /70 (BP Location: Left arm, Patient Position: Sitting)   Pulse 77   Ht 163.8 cm (64.5\")   Wt 60.8 kg (134 lb)   BMI 22.65 kg/m²      Wt Readings from Last 5 Encounters:   09/11/18 60.8 kg (134 lb)   08/21/18 61.2 kg (135 lb)   08/16/18 61.2 kg (135 lb)   06/20/18 57.3 kg (126 lb 5.2 oz)   05/15/18 59.9 kg (132 lb)     BP Readings from Last 5 Encounters:   09/11/18 116/70   08/16/18 174/80   05/15/18 118/68   09/12/17 118/68   03/07/17 104/64       General-Well Nourished, Well developed  Eyes - PERRLA  Neck- supple, No mass  CV- regular rate and rhythm, no MRG  Lung- clear bilaterally  Abd- soft, +BS  Musc/skel - Norm strength and range of motion  Skin- warm and dry  Neuro - Alert & " Oriented x 3, appropriate mood.    Medical problems and test results were reviewed with the patient today.     Results for orders placed or performed in visit on 08/16/18   Comprehensive Metabolic Panel   Result Value Ref Range    Glucose 93 70 - 100 mg/dL    BUN 17 9 - 23 mg/dL    Creatinine 0.80 0.60 - 1.30 mg/dL    Sodium 137 132 - 146 mmol/L    Potassium 4.6 3.5 - 5.5 mmol/L    Chloride 104 99 - 109 mmol/L    CO2 29.0 20.0 - 31.0 mmol/L    Calcium 9.8 8.7 - 10.4 mg/dL    Total Protein 7.1 5.7 - 8.2 g/dL    Albumin 4.35 3.20 - 4.80 g/dL    ALT (SGPT) 20 7 - 40 U/L    AST (SGOT) 24 0 - 33 U/L    Alkaline Phosphatase 88 25 - 100 U/L    Total Bilirubin 0.7 0.3 - 1.2 mg/dL    eGFR Non African Amer 72 >60 mL/min/1.73    Globulin 2.8 gm/dL    A/G Ratio 1.6 1.5 - 2.5 g/dL    BUN/Creatinine Ratio 21.3 7.0 - 25.0    Anion Gap 4.0 3.0 - 11.0 mmol/L   CBC Auto Differential   Result Value Ref Range    WBC 4.56 3.50 - 10.80 10*3/mm3    RBC 4.56 3.89 - 5.14 10*6/mm3    Hemoglobin 14.5 11.5 - 15.5 g/dL    Hematocrit 44.6 (H) 34.5 - 44.0 %    MCV 97.8 80.0 - 99.0 fL    MCH 31.8 (H) 27.0 - 31.0 pg    MCHC 32.5 32.0 - 36.0 g/dL    RDW 13.0 11.3 - 14.5 %    RDW-SD 46.2 37.0 - 54.0 fl    MPV 11.5 6.0 - 12.0 fL    Platelets 270 150 - 450 10*3/mm3    Neutrophil % 49.2 41.0 - 71.0 %    Lymphocyte % 36.4 24.0 - 44.0 %    Monocyte % 12.7 (H) 0.0 - 12.0 %    Eosinophil % 1.3 0.0 - 3.0 %    Basophil % 0.4 0.0 - 1.0 %    Immature Grans % 0.2 0.0 - 0.6 %    Neutrophils, Absolute 2.24 1.50 - 8.30 10*3/mm3    Lymphocytes, Absolute 1.66 0.60 - 4.80 10*3/mm3    Monocytes, Absolute 0.58 0.00 - 1.00 10*3/mm3    Eosinophils, Absolute 0.06 0.00 - 0.30 10*3/mm3    Basophils, Absolute 0.02 0.00 - 0.20 10*3/mm3    Immature Grans, Absolute 0.01 0.00 - 0.03 10*3/mm3   TSH   Result Value Ref Range    TSH 0.565 0.350 - 5.350 mIU/mL   CK   Result Value Ref Range    Creatine Kinase 58 26 - 174 U/L   Troponin   Result Value Ref Range    Troponin I <0.006  <=0.039 ng/mL   Adult Transthoracic Echo Complete W/ Cont if Necessary Per Protocol   Result Value Ref Range    BSA 1.7 m^2    IVSd 0.83 cm    LVIDd 4.5 cm    LVIDs 2.3 cm    LVPWd 0.79 cm    IVS/LVPW 1.0     FS 49.0 %    EDV(Teich) 92.8 ml    ESV(Teich) 18.1 ml    EF(Teich) 80.5 %    EDV(cubed) 91.5 ml    ESV(cubed) 12.1 ml    EF(cubed) 86.7 %    LV mass(C)d 115.6 grams    LV mass(C)dI 69.9 grams/m^2    SV(Teich) 74.7 ml    SI(Teich) 45.1 ml/m^2    SV(cubed) 79.4 ml    SI(cubed) 48.0 ml/m^2    Ao root diam 2.5 cm    Ao root area 4.7 cm^2    LA dimension 3.8 cm    LA/Ao 1.6     LVLd ap4 7.7 cm    EDV(MOD-sp4) 53.0 ml    LVLs ap4 5.5 cm    ESV(MOD-sp4) 7.0 ml    EF(MOD-sp4) 86.8 %    LVLd ap2 7.5 cm    EDV(MOD-sp2) 38.0 ml    LVLs ap2 5.2 cm    ESV(MOD-sp2) 11.0 ml    EF(MOD-sp2) 71.1 %    SV(MOD-sp4) 46.0 ml    SI(MOD-sp4) 27.8 ml/m^2    SV(MOD-sp2) 27.0 ml    SI(MOD-sp2) 16.3 ml/m^2    Ao root area (BSA corrected) 1.5     LV Abel Vol (BSA corrected) 32.0 ml/m^2    LV Sys Vol (BSA corrected) 4.2 ml/m^2    MV E max césar 92.8 cm/sec    MV A max césar 93.3 cm/sec    MV E/A 0.99     TR max césar 200.6 cm/sec    Pulm Sys César 56.8 cm/sec    Pulm Abel César 29.1 cm/sec    Pulm S/D 1.9     Pulm A Revs César 31.6 cm/sec     CV ECHO STEPHEN - BZI_BMI 23.2 kilograms/m^2    BH CV ECHO STEPHEN - BSA([facility]) 1.7 m^2     CV ECHO STEPHEN - BZI_METRIC_WEIGHT 61.2 kg     CV ECHO STEPHEN - BZI_METRIC_HEIGHT 162.6 cm    Target HR (85%) 130 bpm    Max. Pred. HR (100%) 153 bpm     CV VAS BP RIGHT /73 mmHg    TDI S' 9.10 cm/sec    RV Base 3.20 cm    RV Length 6.90 cm    RV Mid 2.60 cm    LA Volume Index 15.0 mL/m2    RAP systole 3 mmHg    RVSP(TR) 19 mmHg     CV ECHO STEPHEN - TR MAX PG 16     TAPSE (>1.6) 1.30 cm2         Lab Results   Component Value Date    CHOL 288 (H) 05/15/2018    HDL 95 (H) 05/15/2018     (H) 05/15/2018       EKG:  (EKG/Tracing has been independently visualized by me and summarized below)      ECG 12  Lead  Date/Time: 9/11/2018 9:03 AM  Performed by: ALBERT HANSEN  Authorized by: ALBERT HANSEN   Rhythm: sinus rhythm  Rate: normal  BPM: 77  QRS axis: normal              ASSESSMENT and PLAN    1. PACs/PVCs- holter revealing 2.2% PACs and 0.1% PVCs; Normal LVEF; symptoms have improved and almost resolved. Will just monitor for now and if recurrence can consider Flecainide therapy.   2. Palpitations       No Follow-up on file.      Scribed for Albert Hansen MD by Ngoc Alvares PA-C. 9/11/2018  8:42 AM        Albert Hansen M.D., F.A.C.C, F.H.R.S.  Cardiology/Electrophysiology  09/11/18  8:42 AM

## 2018-09-11 ENCOUNTER — CONSULT (OUTPATIENT)
Dept: CARDIOLOGY | Facility: CLINIC | Age: 68
End: 2018-09-11

## 2018-09-11 VITALS
HEART RATE: 77 BPM | DIASTOLIC BLOOD PRESSURE: 70 MMHG | HEIGHT: 65 IN | SYSTOLIC BLOOD PRESSURE: 116 MMHG | BODY MASS INDEX: 22.33 KG/M2 | WEIGHT: 134 LBS

## 2018-09-11 DIAGNOSIS — I49.3 PVC (PREMATURE VENTRICULAR CONTRACTION): Primary | ICD-10-CM

## 2018-09-11 DIAGNOSIS — I49.1 PAC (PREMATURE ATRIAL CONTRACTION): ICD-10-CM

## 2018-09-11 DIAGNOSIS — I49.3 FREQUENT UNIFOCAL PVCS: ICD-10-CM

## 2018-09-11 PROCEDURE — 99203 OFFICE O/P NEW LOW 30 MIN: CPT | Performed by: INTERNAL MEDICINE

## 2018-09-11 PROCEDURE — 93000 ELECTROCARDIOGRAM COMPLETE: CPT | Performed by: INTERNAL MEDICINE

## 2018-09-18 ENCOUNTER — PRIOR AUTHORIZATION (OUTPATIENT)
Dept: ENDOCRINOLOGY | Facility: CLINIC | Age: 68
End: 2018-09-18

## 2018-09-18 NOTE — TELEPHONE ENCOUNTER
PA form completed and faxed to [employer] [facility] with additional information necessary for the PA for Vivelle-Dot Patch  Will await response

## 2018-09-20 NOTE — TELEPHONE ENCOUNTER
PA approved per [employer] from 8-19-18 - 9-18-19 fir nithya dit 0.25mg/24 hour patch  Case ID [id number]

## 2018-11-01 RX ORDER — ESTRADIOL 0.03 MG/D
PATCH, EXTENDED RELEASE TRANSDERMAL
Qty: 8 EACH | Refills: 0 | Status: SHIPPED | OUTPATIENT
Start: 2018-11-01 | End: 2018-11-06 | Stop reason: SDUPTHER

## 2018-11-06 ENCOUNTER — OFFICE VISIT (OUTPATIENT)
Dept: ENDOCRINOLOGY | Facility: CLINIC | Age: 68
End: 2018-11-06

## 2018-11-06 VITALS
SYSTOLIC BLOOD PRESSURE: 124 MMHG | OXYGEN SATURATION: 98 % | HEIGHT: 64 IN | BODY MASS INDEX: 22.79 KG/M2 | WEIGHT: 133.5 LBS | HEART RATE: 66 BPM | DIASTOLIC BLOOD PRESSURE: 72 MMHG

## 2018-11-06 DIAGNOSIS — R73.03 PREDIABETES: ICD-10-CM

## 2018-11-06 DIAGNOSIS — E21.0 PRIMARY HYPERPARATHYROIDISM (HCC): ICD-10-CM

## 2018-11-06 DIAGNOSIS — E55.9 VITAMIN D DEFICIENCY: ICD-10-CM

## 2018-11-06 DIAGNOSIS — E78.00 PURE HYPERCHOLESTEROLEMIA: Primary | ICD-10-CM

## 2018-11-06 DIAGNOSIS — E03.9 ACQUIRED HYPOTHYROIDISM: ICD-10-CM

## 2018-11-06 LAB
25(OH)D3 SERPL-MCNC: 33.7 NG/ML
ALBUMIN SERPL-MCNC: 4.3 G/DL (ref 3.2–4.8)
ALBUMIN/GLOB SERPL: 1.9 G/DL (ref 1.5–2.5)
ALP SERPL-CCNC: 92 U/L (ref 25–100)
ALT SERPL W P-5'-P-CCNC: 15 U/L (ref 7–40)
ANION GAP SERPL CALCULATED.3IONS-SCNC: 2 MMOL/L (ref 3–11)
ARTICHOKE IGE QN: 180 MG/DL (ref 0–130)
AST SERPL-CCNC: 26 U/L (ref 0–33)
BILIRUB SERPL-MCNC: 0.4 MG/DL (ref 0.3–1.2)
BUN BLD-MCNC: 22 MG/DL (ref 9–23)
BUN/CREAT SERPL: 26.2 (ref 7–25)
CA-I SERPL ISE-MCNC: 1.38 MMOL/L (ref 1.12–1.32)
CALCIUM SPEC-SCNC: 9.7 MG/DL (ref 8.7–10.4)
CHLORIDE SERPL-SCNC: 108 MMOL/L (ref 99–109)
CHOLEST SERPL-MCNC: 291 MG/DL (ref 0–200)
CO2 SERPL-SCNC: 30 MMOL/L (ref 20–31)
CREAT BLD-MCNC: 0.84 MG/DL (ref 0.6–1.3)
GFR SERPL CREATININE-BSD FRML MDRD: 68 ML/MIN/1.73
GLOBULIN UR ELPH-MCNC: 2.3 GM/DL
GLUCOSE BLD-MCNC: 95 MG/DL (ref 70–100)
HDLC SERPL-MCNC: 84 MG/DL (ref 40–60)
POTASSIUM BLD-SCNC: 4.7 MMOL/L (ref 3.5–5.5)
PROT SERPL-MCNC: 6.6 G/DL (ref 5.7–8.2)
PTH-INTACT SERPL-MCNC: 74.8 PG/ML (ref 11–80)
SODIUM BLD-SCNC: 140 MMOL/L (ref 132–146)
T4 FREE SERPL-MCNC: 1.17 NG/DL (ref 0.89–1.76)
TRIGL SERPL-MCNC: 168 MG/DL (ref 0–150)
TSH SERPL DL<=0.05 MIU/L-ACNC: 0.62 MIU/ML (ref 0.35–5.35)

## 2018-11-06 PROCEDURE — 80061 LIPID PANEL: CPT | Performed by: INTERNAL MEDICINE

## 2018-11-06 PROCEDURE — 99214 OFFICE O/P EST MOD 30 MIN: CPT | Performed by: INTERNAL MEDICINE

## 2018-11-06 PROCEDURE — 82330 ASSAY OF CALCIUM: CPT | Performed by: INTERNAL MEDICINE

## 2018-11-06 PROCEDURE — 84439 ASSAY OF FREE THYROXINE: CPT | Performed by: INTERNAL MEDICINE

## 2018-11-06 PROCEDURE — 80053 COMPREHEN METABOLIC PANEL: CPT | Performed by: INTERNAL MEDICINE

## 2018-11-06 PROCEDURE — 82306 VITAMIN D 25 HYDROXY: CPT | Performed by: INTERNAL MEDICINE

## 2018-11-06 PROCEDURE — 83970 ASSAY OF PARATHORMONE: CPT | Performed by: INTERNAL MEDICINE

## 2018-11-06 PROCEDURE — 84443 ASSAY THYROID STIM HORMONE: CPT | Performed by: INTERNAL MEDICINE

## 2018-11-06 RX ORDER — INFLUENZA A VIRUS A/MICHIGAN/45/2015 X-275 (H1N1) ANTIGEN (FORMALDEHYDE INACTIVATED), INFLUENZA A VIRUS A/SINGAPORE/INFIMH-16-0019/2016 IVR-186 (H3N2) ANTIGEN (FORMALDEHYDE INACTIVATED), AND INFLUENZA B VIRUS B/MARYLAND/15/2016 BX-69A (A B/COLORADO/6/2017-LIKE VIRUS) ANTIGEN (FORMALDEHYDE INACTIVATED) 60; 60; 60 UG/.5ML; UG/.5ML; UG/.5ML
INJECTION, SUSPENSION INTRAMUSCULAR
Refills: 0 | COMMUNITY
Start: 2018-10-28

## 2018-11-06 NOTE — PROGRESS NOTES
Beverley Yoo 68 y.o.  [facility]:Follow-up; Hypothyroidism; Hyperlipidemia; Vitamin D Deficiency; Abnormal Calcium; and Prediabetes      [place]: Follow-up; Hypothyroidism; Hyperlipidemia; Vitamin D Deficiency; Abnormal Calcium; and Prediabetes    bp is good  Is on synthroid 100 mcg   No missed doses or problems with dosing  Heart arrhythmia has resolved- is established with cardiology     No Known Allergies    Current Outpatient Prescriptions:   •  FLUZONE HIGH-DOSE 0.5 ML suspension prefilled syringe injection, , Disp: , Rfl: 0  •  SYNTHROID 100 MCG tablet, Take 1 tablet by mouth Daily., Disp: 90 tablet, Rfl: 1  •  tretinoin (RETIN-A) 0.05 % cream, ANALIA AA HS, Disp: , Rfl: 11  •  VIVELLE-DOT 0.025 MG/24HR patch, Place 1 patch on the skin 2 (Two) Times a Week. Allergic to generic, Disp: 24 patch, Rfl: 3  •  YUVAFEM 10 MCG tablet vaginal tablet, INSERT 1 TABLET VAGINALLY 2 TO 3 TIMES PER WEEK, Disp: 48 tablet, Rfl: 1  Patient Active Problem List    Diagnosis   • Prediabetes [R73.03]   • PAC (premature atrial contraction) [I49.1]   • Frequent unifocal PVCs [I49.3]   • Acquired hallux rigidus of left foot [M20.22]   • Plantar fascial fibromatosis of right foot [M72.2]   • Primary hyperparathyroidism (CMS/HCC) [E21.0]   • Bunion of left foot [M21.612]   • H/O colonoscopy [Z98.890]   • Hypocalcemia [E83.51]   • Menopausal symptoms [N95.1]   • Osteopenia [M85.80]   • Seasonal allergies [J30.2]   • Vitamin D deficiency [E55.9]   • Fibrocystic breast disease [N60.19]   • Hypercalcemia [E83.52]   • Hyperlipidemia [E78.5]   • Hypothyroidism [E03.9]   • Knee pain [M25.569]   • Mammogram abnormal [R92.8]     Review of Systems   Constitutional: Negative for activity change, appetite change, chills, diaphoresis, fatigue, fever and unexpected weight change.   HENT: Negative for congestion, dental problem, drooling, ear discharge, ear pain, facial swelling, hearing loss, mouth sores, nosebleeds, postnasal drip, rhinorrhea, sinus pressure,  sneezing, sore throat, tinnitus, trouble swallowing and voice change.    Eyes: Negative for photophobia, pain, discharge, redness, itching and visual disturbance.   Respiratory: Negative for apnea, cough, choking, chest tightness, shortness of breath, wheezing and stridor.    Cardiovascular: Negative for chest pain, palpitations and leg swelling.   Gastrointestinal: Negative for abdominal distention, abdominal pain, anal bleeding, blood in stool, constipation, diarrhea, nausea, rectal pain and vomiting.   Endocrine: Negative for cold intolerance, heat intolerance, polydipsia, polyphagia and polyuria.   Genitourinary: Negative for decreased urine volume, difficulty urinating, dysuria, enuresis, flank pain, frequency, genital sores, hematuria and urgency.   Musculoskeletal: Positive for arthralgias (right gt toe ). Negative for back pain, gait problem, joint swelling, myalgias, neck pain and neck stiffness.   Skin: Negative for color change, pallor, rash and wound.   Allergic/Immunologic: Negative for environmental allergies, food allergies and immunocompromised state.   Neurological: Negative for dizziness, tremors, seizures, syncope, facial asymmetry, speech difficulty, weakness, light-headedness, numbness and headaches.   Hematological: Negative for adenopathy. Does not bruise/bleed easily.   Psychiatric/Behavioral: Negative for agitation, behavioral problems, confusion, decreased concentration, dysphoric mood, hallucinations, self-injury, sleep disturbance and suicidal ideas. The patient is not nervous/anxious and is not hyperactive.      Social History     Social History   • Marital status:      Spouse name: N/A   • Number of children: N/A   • Years of education: N/A     Occupational History   • Not on file.     Social History Main Topics   • Smoking status: Never Smoker   • Smokeless tobacco: Never Used   • Alcohol use Yes     4 - 5 Glasses of wine per week      Comment: 1 glass of wine 5 days per week  "  • Drug use: No   • Sexual activity: Yes     Partners: Male     Birth control/ protection: Post-menopausal     Other Topics Concern   • Not on file     Social History Narrative   • No narrative on file     Family History   Problem Relation Age of Onset   • Cancer Paternal Grandfather         BREAST    • Breast cancer Mother 60   • Breast cancer Sister 60   • Breast cancer Maternal Grandmother 60   • Cancer Father    • Anemia Father    • Hyperlipidemia Father      /72   Pulse 66   Ht 163.2 cm (64.25\")   Wt 60.6 kg (133 lb 8 oz)   SpO2 98%   BMI 22.74 kg/m²   Physical Exam   Constitutional: She is oriented to person, place, and time. She appears well-developed and well-nourished.   HENT:   Head: Normocephalic and atraumatic.   Nose: Nose normal.   Mouth/Throat: Oropharynx is clear and moist.   Eyes: Pupils are equal, round, and reactive to light. Conjunctivae, EOM and lids are normal.   Neck: Trachea normal and normal range of motion. Neck supple. Carotid bruit is not present. No tracheal deviation present. No thyroid mass and no thyromegaly present.   Cardiovascular: Normal rate, regular rhythm, normal heart sounds and intact distal pulses.  Exam reveals no gallop and no friction rub.    No murmur heard.  Pulmonary/Chest: Effort normal and breath sounds normal. No respiratory distress. She has no wheezes.   Musculoskeletal: Normal range of motion. She exhibits no edema or deformity.   Left gt toe oa    Lymphadenopathy:     She has no cervical adenopathy.   Neurological: She is alert and oriented to person, place, and time. She has normal reflexes. She displays normal reflexes. No cranial nerve deficit.   Skin: Skin is warm and dry. No rash noted. No cyanosis or erythema. Nails show no clubbing.   Psychiatric: She has a normal mood and affect. Her speech is normal and behavior is normal. Judgment and thought content normal. Cognition and memory are normal.   Nursing note and vitals reviewed.    Results for " orders placed or performed in visit on 08/16/18   Comprehensive Metabolic Panel   Result Value Ref Range    Glucose 93 70 - 100 mg/dL    BUN 17 9 - 23 mg/dL    Creatinine 0.80 0.60 - 1.30 mg/dL    Sodium 137 132 - 146 mmol/L    Potassium 4.6 3.5 - 5.5 mmol/L    Chloride 104 99 - 109 mmol/L    CO2 29.0 20.0 - 31.0 mmol/L    Calcium 9.8 8.7 - 10.4 mg/dL    Total Protein 7.1 5.7 - 8.2 g/dL    Albumin 4.35 3.20 - 4.80 g/dL    ALT (SGPT) 20 7 - 40 U/L    AST (SGOT) 24 0 - 33 U/L    Alkaline Phosphatase 88 25 - 100 U/L    Total Bilirubin 0.7 0.3 - 1.2 mg/dL    eGFR Non African Amer 72 >60 mL/min/1.73    Globulin 2.8 gm/dL    A/G Ratio 1.6 1.5 - 2.5 g/dL    BUN/Creatinine Ratio 21.3 7.0 - 25.0    Anion Gap 4.0 3.0 - 11.0 mmol/L   CBC Auto Differential   Result Value Ref Range    WBC 4.56 3.50 - 10.80 10*3/mm3    RBC 4.56 3.89 - 5.14 10*6/mm3    Hemoglobin 14.5 11.5 - 15.5 g/dL    Hematocrit 44.6 (H) 34.5 - 44.0 %    MCV 97.8 80.0 - 99.0 fL    MCH 31.8 (H) 27.0 - 31.0 pg    MCHC 32.5 32.0 - 36.0 g/dL    RDW 13.0 11.3 - 14.5 %    RDW-SD 46.2 37.0 - 54.0 fl    MPV 11.5 6.0 - 12.0 fL    Platelets 270 150 - 450 10*3/mm3    Neutrophil % 49.2 41.0 - 71.0 %    Lymphocyte % 36.4 24.0 - 44.0 %    Monocyte % 12.7 (H) 0.0 - 12.0 %    Eosinophil % 1.3 0.0 - 3.0 %    Basophil % 0.4 0.0 - 1.0 %    Immature Grans % 0.2 0.0 - 0.6 %    Neutrophils, Absolute 2.24 1.50 - 8.30 10*3/mm3    Lymphocytes, Absolute 1.66 0.60 - 4.80 10*3/mm3    Monocytes, Absolute 0.58 0.00 - 1.00 10*3/mm3    Eosinophils, Absolute 0.06 0.00 - 0.30 10*3/mm3    Basophils, Absolute 0.02 0.00 - 0.20 10*3/mm3    Immature Grans, Absolute 0.01 0.00 - 0.03 10*3/mm3   TSH   Result Value Ref Range    TSH 0.565 0.350 - 5.350 mIU/mL   CK   Result Value Ref Range    Creatine Kinase 58 26 - 174 U/L   Troponin   Result Value Ref Range    Troponin I <0.006 <=0.039 ng/mL   Adult Transthoracic Echo Complete W/ Cont if Necessary Per Protocol   Result Value Ref Range    BSA 1.7 m^2     IVSd 0.83 cm    LVIDd 4.5 cm    LVIDs 2.3 cm    LVPWd 0.79 cm    IVS/LVPW 1.0     FS 49.0 %    EDV(Teich) 92.8 ml    ESV(Teich) 18.1 ml    EF(Teich) 80.5 %    EDV(cubed) 91.5 ml    ESV(cubed) 12.1 ml    EF(cubed) 86.7 %    LV mass(C)d 115.6 grams    LV mass(C)dI 69.9 grams/m^2    SV(Teich) 74.7 ml    SI(Teich) 45.1 ml/m^2    SV(cubed) 79.4 ml    SI(cubed) 48.0 ml/m^2    Ao root diam 2.5 cm    Ao root area 4.7 cm^2    LA dimension 3.8 cm    LA/Ao 1.6     LVLd ap4 7.7 cm    EDV(MOD-sp4) 53.0 ml    LVLs ap4 5.5 cm    ESV(MOD-sp4) 7.0 ml    EF(MOD-sp4) 86.8 %    LVLd ap2 7.5 cm    EDV(MOD-sp2) 38.0 ml    LVLs ap2 5.2 cm    ESV(MOD-sp2) 11.0 ml    EF(MOD-sp2) 71.1 %    SV(MOD-sp4) 46.0 ml    SI(MOD-sp4) 27.8 ml/m^2    SV(MOD-sp2) 27.0 ml    SI(MOD-sp2) 16.3 ml/m^2    Ao root area (BSA corrected) 1.5     LV Abel Vol (BSA corrected) 32.0 ml/m^2    LV Sys Vol (BSA corrected) 4.2 ml/m^2    MV E max césar 92.8 cm/sec    MV A max césar 93.3 cm/sec    MV E/A 0.99     TR max césar 200.6 cm/sec    Pulm Sys César 56.8 cm/sec    Pulm Abel César 29.1 cm/sec    Pulm S/D 1.9     Pulm A Revs César 31.6 cm/sec    BH CV ECHO STEPHEN - BZI_BMI 23.2 kilograms/m^2    BH CV ECHO STEPHEN - BSA([facility]) 1.7 m^2    BH CV ECHO STEPHEN - BZI_METRIC_WEIGHT 61.2 kg    BH CV ECHO STEPHEN - BZI_METRIC_HEIGHT 162.6 cm    Target HR (85%) 130 bpm    Max. Pred. HR (100%) 153 bpm    BH CV VAS BP RIGHT /73 mmHg    TDI S' 9.10 cm/sec    RV Base 3.20 cm    RV Length 6.90 cm    RV Mid 2.60 cm    LA Volume Index 15.0 mL/m2    RAP systole 3 mmHg    RVSP(TR) 19 mmHg    BH CV ECHO STEPHEN - TR MAX PG 16     TAPSE (>1.6) 1.30 cm2     Problem List Items Addressed This Visit        Cardiovascular and Mediastinum    Hyperlipidemia - Primary     Check flp          Relevant Orders    Comprehensive Metabolic Panel    Lipid Panel       Digestive    Vitamin D deficiency     Update vitamin D levels             Endocrine    Primary hyperparathyroidism (CMS/HCC)     Update ca and D, pth    Osteopenia- no distal 1/3 radius  Update 10/19 with distal 1/3 radius           Relevant Orders    Vitamin D 25 Hydroxy    Calcium, Ionized    PTH, Intact    Hypothyroidism     Check tfts          Relevant Orders    TSH    T4, Free       Other    Prediabetes     Check hgn a1c            Return in about 6 months (around 5/6/2019) for Recheck 30 min .      Nela Kenyon MD  Signed Nela Kenyon MD

## 2018-11-08 ENCOUNTER — TELEPHONE (OUTPATIENT)
Dept: ENDOCRINOLOGY | Facility: CLINIC | Age: 68
End: 2018-11-08

## 2018-11-08 RX ORDER — ESTRADIOL 0.03 MG/D
1 PATCH, EXTENDED RELEASE TRANSDERMAL 2 TIMES WEEKLY
Qty: 24 PATCH | Refills: 3 | Status: SHIPPED | OUTPATIENT
Start: 2018-11-08 | End: 2019-11-11 | Stop reason: SDUPTHER

## 2018-11-11 RX ORDER — LEVOTHYROXINE SODIUM 100 MCG
TABLET ORAL
Qty: 90 TABLET | Refills: 1 | Status: SHIPPED | OUTPATIENT
Start: 2018-11-11 | End: 2019-05-10 | Stop reason: SDUPTHER

## 2019-01-23 RX ORDER — ESTRADIOL 10 UG/1
INSERT VAGINAL
Qty: 48 TABLET | Refills: 1 | Status: SHIPPED | OUTPATIENT
Start: 2019-01-23 | End: 2019-11-18 | Stop reason: SDUPTHER

## 2019-04-22 ENCOUNTER — TRANSCRIBE ORDERS (OUTPATIENT)
Dept: ADMINISTRATIVE | Facility: HOSPITAL | Age: 69
End: 2019-04-22

## 2019-04-22 DIAGNOSIS — Z12.31 VISIT FOR SCREENING MAMMOGRAM: Primary | ICD-10-CM

## 2019-05-10 RX ORDER — LEVOTHYROXINE SODIUM 100 MCG
TABLET ORAL
Qty: 90 TABLET | Refills: 1 | Status: SHIPPED | OUTPATIENT
Start: 2019-05-10 | End: 2019-11-26 | Stop reason: SDUPTHER

## 2019-05-14 ENCOUNTER — OFFICE VISIT (OUTPATIENT)
Dept: ENDOCRINOLOGY | Facility: CLINIC | Age: 69
End: 2019-05-14

## 2019-05-14 VITALS
BODY MASS INDEX: 22.39 KG/M2 | WEIGHT: 134.4 LBS | SYSTOLIC BLOOD PRESSURE: 138 MMHG | DIASTOLIC BLOOD PRESSURE: 72 MMHG | HEART RATE: 70 BPM | OXYGEN SATURATION: 99 % | HEIGHT: 65 IN

## 2019-05-14 DIAGNOSIS — E83.52 HYPERCALCEMIA: ICD-10-CM

## 2019-05-14 DIAGNOSIS — N39.3 URINARY, INCONTINENCE, STRESS FEMALE: ICD-10-CM

## 2019-05-14 DIAGNOSIS — E55.9 VITAMIN D DEFICIENCY: ICD-10-CM

## 2019-05-14 DIAGNOSIS — E03.9 ACQUIRED HYPOTHYROIDISM: ICD-10-CM

## 2019-05-14 DIAGNOSIS — G57.00 PYRIFORMIS SYNDROME, UNSPECIFIED LATERALITY: ICD-10-CM

## 2019-05-14 DIAGNOSIS — E21.0 PRIMARY HYPERPARATHYROIDISM (HCC): ICD-10-CM

## 2019-05-14 DIAGNOSIS — E78.00 PURE HYPERCHOLESTEROLEMIA: Primary | ICD-10-CM

## 2019-05-14 PROBLEM — R73.03 PREDIABETES: Status: RESOLVED | Noted: 2018-11-06 | Resolved: 2019-05-14

## 2019-05-14 LAB
25(OH)D3 SERPL-MCNC: 46.2 NG/ML (ref 30–100)
ALBUMIN SERPL-MCNC: 4 G/DL (ref 3.5–5.2)
ALBUMIN/GLOB SERPL: 1.2 G/DL
ALP SERPL-CCNC: 87 U/L (ref 39–117)
ALT SERPL W P-5'-P-CCNC: 12 U/L (ref 1–33)
ANION GAP SERPL CALCULATED.3IONS-SCNC: 11.6 MMOL/L
AST SERPL-CCNC: 20 U/L (ref 1–32)
BILIRUB SERPL-MCNC: 0.2 MG/DL (ref 0.2–1.2)
BUN BLD-MCNC: 17 MG/DL (ref 8–23)
BUN/CREAT SERPL: 21.5 (ref 7–25)
CA-I BLD-MCNC: 5.6 MG/DL (ref 4.6–5.4)
CA-I SERPL ISE-MCNC: 1.41 MMOL/L (ref 1.15–1.35)
CALCIUM SPEC-SCNC: 10.1 MG/DL (ref 8.6–10.5)
CHLORIDE SERPL-SCNC: 102 MMOL/L (ref 98–107)
CHOLEST SERPL-MCNC: 258 MG/DL (ref 0–200)
CO2 SERPL-SCNC: 25.4 MMOL/L (ref 22–29)
CREAT BLD-MCNC: 0.79 MG/DL (ref 0.57–1)
GFR SERPL CREATININE-BSD FRML MDRD: 73 ML/MIN/1.73
GLOBULIN UR ELPH-MCNC: 3.4 GM/DL
GLUCOSE BLD-MCNC: 87 MG/DL (ref 65–99)
HDLC SERPL-MCNC: 68 MG/DL (ref 40–60)
LDLC SERPL CALC-MCNC: 164 MG/DL (ref 0–100)
LDLC/HDLC SERPL: 2.41 {RATIO}
POTASSIUM BLD-SCNC: 4.4 MMOL/L (ref 3.5–5.2)
PROT SERPL-MCNC: 7.4 G/DL (ref 6–8.5)
PTH-INTACT SERPL-MCNC: 55.9 PG/ML (ref 15–65)
SODIUM BLD-SCNC: 139 MMOL/L (ref 136–145)
T4 FREE SERPL-MCNC: 1.02 NG/DL (ref 0.93–1.7)
TRIGL SERPL-MCNC: 131 MG/DL (ref 0–150)
TSH SERPL DL<=0.05 MIU/L-ACNC: 2.19 MIU/ML (ref 0.27–4.2)
VLDLC SERPL-MCNC: 26.2 MG/DL (ref 5–40)

## 2019-05-14 PROCEDURE — 82306 VITAMIN D 25 HYDROXY: CPT | Performed by: INTERNAL MEDICINE

## 2019-05-14 PROCEDURE — 82330 ASSAY OF CALCIUM: CPT | Performed by: INTERNAL MEDICINE

## 2019-05-14 PROCEDURE — 99214 OFFICE O/P EST MOD 30 MIN: CPT | Performed by: INTERNAL MEDICINE

## 2019-05-14 PROCEDURE — 80053 COMPREHEN METABOLIC PANEL: CPT | Performed by: INTERNAL MEDICINE

## 2019-05-14 PROCEDURE — 84439 ASSAY OF FREE THYROXINE: CPT | Performed by: INTERNAL MEDICINE

## 2019-05-14 PROCEDURE — 80061 LIPID PANEL: CPT | Performed by: INTERNAL MEDICINE

## 2019-05-14 PROCEDURE — 83970 ASSAY OF PARATHORMONE: CPT | Performed by: INTERNAL MEDICINE

## 2019-05-14 PROCEDURE — 84443 ASSAY THYROID STIM HORMONE: CPT | Performed by: INTERNAL MEDICINE

## 2019-05-14 RX ORDER — SCOLOPAMINE TRANSDERMAL SYSTEM 1 MG/1
1 PATCH, EXTENDED RELEASE TRANSDERMAL
Qty: 4 PATCH | Refills: 1 | Status: SHIPPED | OUTPATIENT
Start: 2019-05-14 | End: 2019-05-14 | Stop reason: SDUPTHER

## 2019-05-14 RX ORDER — SCOLOPAMINE TRANSDERMAL SYSTEM 1 MG/1
1 PATCH, EXTENDED RELEASE TRANSDERMAL
Qty: 4 PATCH | Refills: 1 | Status: SHIPPED | OUTPATIENT
Start: 2019-05-14 | End: 2019-11-18

## 2019-05-14 NOTE — PROGRESS NOTES
Beverley Jacobsonley 68 y.o.  CC:Follow-up; Hypothyroidism; Hyperlipidemia; Vitamin D Deficiency; and Abnormal Calcium    [place]: Follow-up; Hypothyroidism; Hyperlipidemia; Vitamin D Deficiency; and Abnormal Calcium    Recovering from uri with conjunctivitis   Cough just now resolving   Is on day # 7 amoxil   Was hoarse and purulent yellow eye drainage   Energy is good  Is on low fat diet - no current medication for high cholesterol  Is on vitamin D supplement   Hyperparathyroid- no symptoms currently   Has been exercising more with [employer]- pilates 3 x a week and  for balance and upper body strength   Has developed pyriformis syndrome   Would like PT for this- is using heat and stretching currently  With uri has usi- options for management discussed  Is using hrt- using vivelle dot   Pros and cons of hrt discussed  Going on cruise to [facility] - discussed motion sickness and she would like scopalamine patch     No Known Allergies    Current Outpatient Medications:   •  amoxicillin (AMOXIL) 875 MG tablet, Take 1 tablet by mouth 2 (Two) Times a Day for 10 days., Disp: 20 tablet, Rfl: 0  •  estradiol (VAGIFEM) 10 MCG tablet vaginal tablet, INSERT 1 TABLET VAGINALLY 2 TO 3 TIMES PER WEEK, Disp: 48 tablet, Rfl: 1  •  fluticasone (FLONASE) 50 MCG/ACT nasal spray, 2 sprays into the nostril(s) as directed by provider Daily., Disp: 1 bottle, Rfl: 0  •  FLUZONE HIGH-DOSE 0.5 ML suspension prefilled syringe injection, , Disp: , Rfl: 0  •  promethazine-dextromethorphan (PROMETHAZINE-DM) 6.25-15 MG/5ML syrup, Take 5 mL by mouth At Night As Needed for Cough., Disp: 75 mL, Rfl: 0  •  sulfacetamide (BLEPH-10) 10 % ophthalmic solution, Administer 1 drop to both eyes 2 (Two) Times a Day., Disp: 10 mL, Rfl: 0  •  SYNTHROID 100 MCG tablet, TAKE 1 TABLET DAILY, Disp: 90 tablet, Rfl: 1  •  tretinoin (RETIN-A) 0.05 % cream, ANALIA AA , Disp: , Rfl: 11  •  VIVELLE-DOT 0.025 MG/24HR patch, Place 1 patch on the skin as directed by  provider 2 (Two) Times a Week. 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   HENT: Positive for congestion and sinus pressure. Negative for dental problem, drooling, ear discharge, ear pain, facial swelling, hearing loss, mouth sores, nosebleeds, postnasal drip, rhinorrhea, sneezing, sore throat, tinnitus, trouble swallowing and voice change.    Eyes: Negative for photophobia, pain, discharge, redness, itching and visual disturbance.   Respiratory: Positive for cough. Negative for apnea, choking, chest tightness, shortness of breath, wheezing and stridor.    Cardiovascular: Negative for chest pain, palpitations and leg swelling.   Gastrointestinal: Negative for abdominal distention, abdominal pain, anal bleeding, blood in stool, constipation, diarrhea, nausea, rectal pain and vomiting.   Endocrine: Negative for cold intolerance, heat intolerance, polydipsia, polyphagia and polyuria.   Genitourinary: Negative for decreased urine volume, difficulty urinating, dysuria, enuresis, flank pain, frequency, genital sores, hematuria and urgency.   Musculoskeletal: Positive for arthralgias and back  "pain. Negative for gait problem, joint swelling, myalgias, neck pain and neck stiffness.   Skin: Negative for color change, pallor, rash and wound.   Allergic/Immunologic: Negative for environmental allergies, food allergies and immunocompromised state.   Neurological: Positive for dizziness. Negative for tremors, seizures, syncope, facial asymmetry, speech difficulty, weakness, light-headedness, numbness and headaches.   Hematological: Negative for adenopathy. Does not bruise/bleed easily.   Psychiatric/Behavioral: Negative for agitation, behavioral problems, confusion, decreased concentration, dysphoric mood, hallucinations, self-injury, sleep disturbance and suicidal ideas. The patient is not nervous/anxious and is not hyperactive.      Social History     Socioeconomic History   • Marital status:      Spouse name: Not on file   • Number of children: Not on file   • Years of education: Not on file   • Highest education level: Not on file   Tobacco Use   • Smoking status: Never Smoker   • Smokeless tobacco: Never Used   Substance and Sexual Activity   • Alcohol use: Yes     Types: 4 Glasses of wine per week     Comment: 1 glass of wine 5 days per week   • Drug use: No   • Sexual activity: Yes     Partners: Male     Birth control/protection: Post-menopausal     Family History   Problem Relation Age of Onset   • Cancer Paternal Grandfather         BREAST    • Breast cancer Mother 60   • Cancer Mother    • Breast cancer Sister 60   • Breast cancer Maternal Grandmother 60   • Cancer Maternal Grandmother    • Cancer Father    • Anemia Father    • Hyperlipidemia Father    • Cancer Sister      /72   Pulse 70   Ht 165.1 cm (65\")   Wt 61 kg (134 lb 6.4 oz)   SpO2 99%   Breastfeeding? No   BMI 22.37 kg/m²   Physical Exam   Constitutional: She is oriented to person, place, and time. She appears well-developed and well-nourished.   HENT:   Head: Normocephalic and atraumatic.   Nose: Nose normal. "   Mouth/Throat: Oropharynx is clear and moist.   Eyes: Conjunctivae, EOM and lids are normal. Pupils are equal, round, and reactive to light.   Neck: Trachea normal and normal range of motion. Neck supple. Carotid bruit is not present. No tracheal deviation present. No thyroid mass and no thyromegaly present.   Cardiovascular: Normal rate, regular rhythm, normal heart sounds and intact distal pulses. Exam reveals no gallop and no friction rub.   No murmur heard.  Pulmonary/Chest: Effort normal and breath sounds normal. No respiratory distress. She has no wheezes.   Musculoskeletal: Normal range of motion. She exhibits no edema or deformity.   Lymphadenopathy:     She has no cervical adenopathy.   Neurological: She is alert and oriented to person, place, and time. She has normal reflexes. She displays normal reflexes. No cranial nerve deficit.   Skin: Skin is warm and dry. No rash noted. No cyanosis or erythema. Nails show no clubbing.   Psychiatric: She has a normal mood and affect. Her speech is normal and behavior is normal. Judgment and thought content normal. Cognition and memory are normal.   Nursing note and vitals reviewed.    Results for orders placed or performed in visit on 11/06/18   Comprehensive Metabolic Panel   Result Value Ref Range    Glucose 95 70 - 100 mg/dL    BUN 22 9 - 23 mg/dL    Creatinine 0.84 0.60 - 1.30 mg/dL    Sodium 140 132 - 146 mmol/L    Potassium 4.7 3.5 - 5.5 mmol/L    Chloride 108 99 - 109 mmol/L    CO2 30.0 20.0 - 31.0 mmol/L    Calcium 9.7 8.7 - 10.4 mg/dL    Total Protein 6.6 5.7 - 8.2 g/dL    Albumin 4.30 3.20 - 4.80 g/dL    ALT (SGPT) 15 7 - 40 U/L    AST (SGOT) 26 0 - 33 U/L    Alkaline Phosphatase 92 25 - 100 U/L    Total Bilirubin 0.4 0.3 - 1.2 mg/dL    eGFR Non African Amer 68 >60 mL/min/1.73    Globulin 2.3 gm/dL    A/G Ratio 1.9 1.5 - 2.5 g/dL    BUN/Creatinine Ratio 26.2 (H) 7.0 - 25.0    Anion Gap 2.0 (L) 3.0 - 11.0 mmol/L   Lipid Panel   Result Value Ref Range     Total Cholesterol 291 (H) 0 - 200 mg/dL    Triglycerides 168 (H) 0 - 150 mg/dL    HDL Cholesterol 84 (H) 40 - 60 mg/dL    LDL Cholesterol  180 (H) 0 - 130 mg/dL   TSH   Result Value Ref Range    TSH 0.617 0.350 - 5.350 mIU/mL   T4, Free   Result Value Ref Range    Free T4 1.17 0.89 - 1.76 ng/dL   Vitamin D 25 Hydroxy   Result Value Ref Range    25 Hydroxy, Vitamin D 33.7 ng/ml   Calcium, Ionized   Result Value Ref Range    Ionized Calcium 1.38 (H) 1.12 - 1.32 mmol/L   PTH, Intact   Result Value Ref Range    PTH, Intact 74.8 11.0 - 80.0 pg/mL     Problem List Items Addressed This Visit        Cardiovascular and Mediastinum    Hyperlipidemia - Primary     Update flp          Relevant Orders    Comprehensive Metabolic Panel    Lipid Panel       Digestive    Vitamin D deficiency     Update vitamin D levels             Endocrine    Primary hyperparathyroidism (CMS/HCC)     Update ca, d and pth  Mild low bone density by recent dexa reviewed  Is on hrt and vitamin D supplements currently          Hypothyroidism     Check tfts          Relevant Orders    T4, Free    TSH       Nervous and Auditory    Pyriformis syndrome, unspecified laterality     Bilateral- using stretching and heat- referral PT provided             Genitourinary    Urinary, incontinence, stress female     Worse with upper resp infection  Options including medication, referral gyn/urology and surgical correction discussed  She will monitor and see if situation improves as uri resolves   She does not want surgery currently and does not want to add another medication   She is using topical and vaginal estrogen             Other    Hypercalcemia     See above         Relevant Orders    Calcium, Ionized    PTH, Intact    Vitamin D 25 Hydroxy        scopalamine patch provided for travel- risks assoc with this medication reviewed with her and alternative options provided  Return in about 6 months (around 11/14/2019) for Recheck 30 min .    Sheryl Rivas,  MA  Signed Nela Kenyon MD

## 2019-05-14 NOTE — ASSESSMENT & PLAN NOTE
Update ca, d and pth  Mild low bone density by recent dexa reviewed  Is on hrt and vitamin D supplements currently

## 2019-05-14 NOTE — ASSESSMENT & PLAN NOTE
Worse with upper resp infection  Options including medication, referral gyn/urology and surgical correction discussed  She will monitor and see if situation improves as uri resolves   She does not want surgery currently and does not want to add another medication   She is using topical and vaginal estrogen

## 2019-06-05 ENCOUNTER — HOSPITAL ENCOUNTER (OUTPATIENT)
Dept: MAMMOGRAPHY | Facility: HOSPITAL | Age: 69
Discharge: HOME | End: 2019-06-05
Admitting: INTERNAL MEDICINE

## 2019-06-05 DIAGNOSIS — Z12.31 VISIT FOR SCREENING MAMMOGRAM: ICD-10-CM

## 2019-06-05 PROCEDURE — 77067 SCR MAMMO BI INCL CAD: CPT

## 2019-06-05 PROCEDURE — 77063 BREAST TOMOSYNTHESIS BI: CPT

## 2019-06-05 PROCEDURE — 77067 SCR MAMMO BI INCL CAD: CPT | Performed by: RADIOLOGY

## 2019-06-05 PROCEDURE — 77063 BREAST TOMOSYNTHESIS BI: CPT | Performed by: RADIOLOGY

## 2019-11-11 RX ORDER — ESTRADIOL 0.03 MG/D
PATCH, EXTENDED RELEASE TRANSDERMAL
Qty: 24 PATCH | Refills: 3 | Status: SHIPPED | OUTPATIENT
Start: 2019-11-11 | End: 2020-11-19

## 2019-11-12 ENCOUNTER — PRIOR AUTHORIZATION (OUTPATIENT)
Dept: ENDOCRINOLOGY | Facility: CLINIC | Age: 69
End: 2019-11-12

## 2019-11-18 ENCOUNTER — OFFICE VISIT (OUTPATIENT)
Dept: ENDOCRINOLOGY | Facility: CLINIC | Age: 69
End: 2019-11-18

## 2019-11-18 VITALS
BODY MASS INDEX: 22.23 KG/M2 | HEART RATE: 75 BPM | WEIGHT: 133.4 LBS | SYSTOLIC BLOOD PRESSURE: 112 MMHG | DIASTOLIC BLOOD PRESSURE: 68 MMHG | OXYGEN SATURATION: 99 % | HEIGHT: 65 IN

## 2019-11-18 DIAGNOSIS — E55.9 VITAMIN D DEFICIENCY: ICD-10-CM

## 2019-11-18 DIAGNOSIS — E03.9 ACQUIRED HYPOTHYROIDISM: ICD-10-CM

## 2019-11-18 DIAGNOSIS — E78.00 PURE HYPERCHOLESTEROLEMIA: Primary | ICD-10-CM

## 2019-11-18 DIAGNOSIS — Z78.0 ASYMPTOMATIC POSTMENOPAUSAL STATE: ICD-10-CM

## 2019-11-18 DIAGNOSIS — E21.0 PRIMARY HYPERPARATHYROIDISM (HCC): ICD-10-CM

## 2019-11-18 PROBLEM — M21.612 BUNION OF LEFT FOOT: Status: RESOLVED | Noted: 2018-05-15 | Resolved: 2019-11-18

## 2019-11-18 PROBLEM — I49.3 FREQUENT UNIFOCAL PVCS: Status: RESOLVED | Noted: 2018-08-16 | Resolved: 2019-11-18

## 2019-11-18 LAB
25(OH)D3 SERPL-MCNC: 41.5 NG/ML (ref 30–100)
ALBUMIN SERPL-MCNC: 4.4 G/DL (ref 3.5–5.2)
ALBUMIN/GLOB SERPL: 1.3 G/DL
ALP SERPL-CCNC: 80 U/L (ref 39–117)
ALT SERPL W P-5'-P-CCNC: 18 U/L (ref 1–33)
ANION GAP SERPL CALCULATED.3IONS-SCNC: 13.7 MMOL/L (ref 5–15)
AST SERPL-CCNC: 24 U/L (ref 1–32)
BILIRUB SERPL-MCNC: 0.6 MG/DL (ref 0.2–1.2)
BUN BLD-MCNC: 17 MG/DL (ref 8–23)
BUN/CREAT SERPL: 21.3 (ref 7–25)
CA-I BLD-MCNC: 5.9 MG/DL (ref 4.6–5.4)
CA-I SERPL ISE-MCNC: 1.48 MMOL/L (ref 1.15–1.35)
CALCIUM SPEC-SCNC: 10.2 MG/DL (ref 8.6–10.5)
CHLORIDE SERPL-SCNC: 102 MMOL/L (ref 98–107)
CHOLEST SERPL-MCNC: 290 MG/DL (ref 0–200)
CO2 SERPL-SCNC: 25.3 MMOL/L (ref 22–29)
CREAT BLD-MCNC: 0.8 MG/DL (ref 0.57–1)
GFR SERPL CREATININE-BSD FRML MDRD: 71 ML/MIN/1.73
GLOBULIN UR ELPH-MCNC: 3.4 GM/DL
GLUCOSE BLD-MCNC: 87 MG/DL (ref 65–99)
HDLC SERPL-MCNC: 89 MG/DL (ref 40–60)
LDLC SERPL CALC-MCNC: 173 MG/DL (ref 0–100)
LDLC/HDLC SERPL: 1.94 {RATIO}
POTASSIUM BLD-SCNC: 4.4 MMOL/L (ref 3.5–5.2)
PROT SERPL-MCNC: 7.8 G/DL (ref 6–8.5)
PTH-INTACT SERPL-MCNC: 49.4 PG/ML (ref 15–65)
SODIUM BLD-SCNC: 141 MMOL/L (ref 136–145)
T4 FREE SERPL-MCNC: 1.39 NG/DL (ref 0.93–1.7)
TRIGL SERPL-MCNC: 141 MG/DL (ref 0–150)
TSH SERPL DL<=0.05 MIU/L-ACNC: 0.56 UIU/ML (ref 0.27–4.2)
VLDLC SERPL-MCNC: 28.2 MG/DL (ref 5–40)

## 2019-11-18 PROCEDURE — 82306 VITAMIN D 25 HYDROXY: CPT | Performed by: INTERNAL MEDICINE

## 2019-11-18 PROCEDURE — 99214 OFFICE O/P EST MOD 30 MIN: CPT | Performed by: INTERNAL MEDICINE

## 2019-11-18 PROCEDURE — 82330 ASSAY OF CALCIUM: CPT | Performed by: INTERNAL MEDICINE

## 2019-11-18 PROCEDURE — 80053 COMPREHEN METABOLIC PANEL: CPT | Performed by: INTERNAL MEDICINE

## 2019-11-18 PROCEDURE — 84443 ASSAY THYROID STIM HORMONE: CPT | Performed by: INTERNAL MEDICINE

## 2019-11-18 PROCEDURE — 83970 ASSAY OF PARATHORMONE: CPT | Performed by: INTERNAL MEDICINE

## 2019-11-18 PROCEDURE — 84439 ASSAY OF FREE THYROXINE: CPT | Performed by: INTERNAL MEDICINE

## 2019-11-18 PROCEDURE — 80061 LIPID PANEL: CPT | Performed by: INTERNAL MEDICINE

## 2019-11-18 RX ORDER — ESTRADIOL 10 UG/1
INSERT VAGINAL
Qty: 48 TABLET | Refills: 1 | Status: SHIPPED | OUTPATIENT
Start: 2019-11-18 | End: 2020-06-03 | Stop reason: SDUPTHER

## 2019-11-18 RX ORDER — ESTRADIOL 10 UG/1
INSERT VAGINAL
Qty: 48 TABLET | Refills: 4 | Status: CANCELLED | OUTPATIENT
Start: 2019-11-18

## 2019-11-18 NOTE — ASSESSMENT & PLAN NOTE
Hypercalcemia with high normal to frankly high PTH   Update levels  Recheck dexa   Never 24 hour urine - ordered   Stay hydrated   Criteria for surgery discussed

## 2019-11-18 NOTE — PROGRESS NOTES
Beverley Yoo 69 y.o.  CC:Follow-up; Hypothyroidism; Hyperlipidemia; Vitamin D Deficiency; and Abnormal Calcium        [place]: Follow-up; Hypothyroidism; Hyperlipidemia; Vitamin D Deficiency; and Abnormal Calcium    Is on synthroid 100 mcg dailiy   Is on low fat diet  bp is good   Is on vitamin D supplement   High calcium - PHPT - risk assoc and signs/symptoms, indications for surgery reviewed    No Known Allergies    Current Outpatient Medications:   •  Calcium-Magnesium-Vitamin D (CALCIUM 500 PO), Take  by mouth 2 (Two) Times a Day., Disp: , Rfl:   •  Fish Oil-Cholecalciferol (OMEGA-3 + D PO), Take  by mouth 2 (Two) Times a Day., Disp: , Rfl:   •  Multiple Vitamins-Minerals (MULTIVITAMIN ADULTS PO), Take  by mouth., Disp: , Rfl:   •  estradiol (VAGIFEM) 10 MCG tablet vaginal tablet, Insert 1 tablet vaginally 3 times per week, Disp: 48 tablet, Rfl: 1  •  FLUZONE HIGH-DOSE 0.5 ML suspension prefilled syringe injection, , Disp: , Rfl: 0  •  SYNTHROID 100 MCG tablet, TAKE 1 TABLET DAILY, Disp: 90 tablet, Rfl: 1  •  tretinoin (RETIN-A) 0.05 % cream, ANALIA AA HS, Disp: , Rfl: 11  •  VIVELLE-DOT 0.025 MG/24HR patch, APPLY 1 PATCH ON THE SKIN AS DIRECTED BY PROVIDER 2 TIMES WEEKLY, Disp: 24 patch, Rfl: 3  Patient Active Problem List    Diagnosis   • Asymptomatic postmenopausal state [Z78.0]   • Urinary, incontinence, stress female [N39.3]   • Pyriformis syndrome, unspecified laterality [G57.00]   • PAC (premature atrial contraction) [I49.1]   • Acquired hallux rigidus of left foot [M20.22]   • 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   HENT: Negative for congestion, dental problem, drooling, ear discharge, ear pain, facial swelling, hearing loss, mouth sores, nosebleeds, postnasal drip, rhinorrhea, sinus pressure, sneezing, sore throat, tinnitus, trouble swallowing and voice change.    Eyes: Negative for photophobia, pain, discharge, redness, itching and visual disturbance.   Respiratory: Negative for apnea, cough, choking, chest tightness, shortness of breath, wheezing and stridor.    Cardiovascular: Negative for chest pain, palpitations and leg swelling.   Gastrointestinal: Negative for abdominal distention, abdominal pain, anal bleeding, blood in stool, constipation, diarrhea, nausea, rectal pain and vomiting.   Endocrine: Negative for cold intolerance, heat intolerance, polydipsia, polyphagia and polyuria.   Genitourinary: Negative for decreased urine volume, difficulty urinating, dysuria, enuresis, flank pain, frequency, genital sores, hematuria and urgency.   Musculoskeletal: Negative for arthralgias, back pain, gait problem, joint swelling, myalgias, neck pain and neck stiffness.   Skin: Negative for color change, pallor, rash and wound.   Allergic/Immunologic: Negative for environmental allergies, food allergies and immunocompromised state.   Neurological: Negative for dizziness, tremors, seizures, syncope, facial asymmetry, speech difficulty, weakness, light-headedness, numbness and headaches.   Hematological: Negative for adenopathy. Does not bruise/bleed easily.   Psychiatric/Behavioral: Negative for agitation, behavioral problems, confusion, decreased concentration, dysphoric mood, hallucinations, self-injury, sleep disturbance and suicidal ideas. The patient is not nervous/anxious and is not hyperactive.      Social History     Socioeconomic History   • Marital status:      Spouse name:  "Not on file   • Number of children: Not on file   • Years of education: Not on file   • Highest education level: Not on file   Tobacco Use   • Smoking status: Never Smoker   • Smokeless tobacco: Never Used   Substance and Sexual Activity   • Alcohol use: Yes     Types: 4 Glasses of wine per week     Comment: 1 glass of wine 5 days per week   • Drug use: No   • Sexual activity: Yes     Partners: Male     Birth control/protection: Post-menopausal, Surgical     Family History   Problem Relation Age of Onset   • Cancer Paternal Grandfather         BREAST    • Breast cancer Mother 60   • Cancer Mother         Breast CA   • Thyroid disease Mother    • Breast cancer Sister 60   • Breast cancer Maternal Grandmother 60   • Cancer Maternal Grandmother         Breast CA   • Cancer Father    • Anemia Father    • Hyperlipidemia Father    • Cancer Sister         Breast CA   • Ovarian cancer Neg Hx      /68   Pulse 75   Ht 165.1 cm (65\")   Wt 60.5 kg (133 lb 6.4 oz)   SpO2 99%   BMI 22.20 kg/m²   Physical Exam   Constitutional: She is oriented to person, place, and time. She appears well-developed and well-nourished.   HENT:   Head: Normocephalic and atraumatic.   Nose: Nose normal.   Mouth/Throat: Oropharynx is clear and moist.   Eyes: Conjunctivae, EOM and lids are normal. Pupils are equal, round, and reactive to light.   Neck: Trachea normal and normal range of motion. Neck supple. Carotid bruit is not present. No tracheal deviation present. No thyroid mass and no thyromegaly present.   Cardiovascular: Normal rate, regular rhythm, normal heart sounds and intact distal pulses. Exam reveals no gallop and no friction rub.   No murmur heard.  Pulmonary/Chest: Effort normal and breath sounds normal. No respiratory distress. She has no wheezes.   Musculoskeletal: Normal range of motion. She exhibits no edema or deformity.   Lymphadenopathy:     She has no cervical adenopathy.   Neurological: She is alert and oriented to " person, place, and time. She has normal reflexes. She displays normal reflexes. No cranial nerve deficit.   Skin: Skin is warm and dry. No rash noted. No cyanosis or erythema. Nails show no clubbing.   Psychiatric: She has a normal mood and affect. Her speech is normal and behavior is normal. Judgment and thought content normal. Cognition and memory are normal.   Nursing note and vitals reviewed.    Results for orders placed or performed in visit on 05/14/19   Comprehensive Metabolic Panel   Result Value Ref Range    Glucose 87 65 - 99 mg/dL    BUN 17 8 - 23 mg/dL    Creatinine 0.79 0.57 - 1.00 mg/dL    Sodium 139 136 - 145 mmol/L    Potassium 4.4 3.5 - 5.2 mmol/L    Chloride 102 98 - 107 mmol/L    CO2 25.4 22.0 - 29.0 mmol/L    Calcium 10.1 8.6 - 10.5 mg/dL    Total Protein 7.4 6.0 - 8.5 g/dL    Albumin 4.00 3.50 - 5.20 g/dL    ALT (SGPT) 12 1 - 33 U/L    AST (SGOT) 20 1 - 32 U/L    Alkaline Phosphatase 87 39 - 117 U/L    Total Bilirubin 0.2 0.2 - 1.2 mg/dL    eGFR Non African Amer 73 >60 mL/min/1.73    Globulin 3.4 gm/dL    A/G Ratio 1.2 g/dL    BUN/Creatinine Ratio 21.5 7.0 - 25.0    Anion Gap 11.6 mmol/L   Lipid Panel   Result Value Ref Range    Total Cholesterol 258 (H) 0 - 200 mg/dL    Triglycerides 131 0 - 150 mg/dL    HDL Cholesterol 68 (H) 40 - 60 mg/dL    LDL Cholesterol  164 (H) 0 - 100 mg/dL    VLDL Cholesterol 26.2 5 - 40 mg/dL    LDL/HDL Ratio 2.41    T4, Free   Result Value Ref Range    Free T4 1.02 0.93 - 1.70 ng/dL   TSH   Result Value Ref Range    TSH 2.190 0.270 - 4.200 mIU/mL   Calcium, Ionized   Result Value Ref Range    Ionized Calcium 1.41 (H) 1.15 - 1.35 mmol/L    Ionized Calcium 5.6 (H) 4.6 - 5.4 mg/dL   PTH, Intact   Result Value Ref Range    PTH, Intact 55.9 15.0 - 65.0 pg/mL   Vitamin D 25 Hydroxy   Result Value Ref Range    25 Hydroxy, Vitamin D 46.2 30.0 - 100.0 ng/ml     Problem List Items Addressed This Visit        Cardiovascular and Mediastinum    Hyperlipidemia - Primary     Is on  low fat diet - check lipid panel          Relevant Orders    Comprehensive Metabolic Panel    Lipid Panel       Digestive    Vitamin D deficiency     Ok to get RDA of vitamin d          Relevant Orders    Vitamin D 25 Hydroxy       Endocrine    Primary hyperparathyroidism (CMS/HCC)     Hypercalcemia with high normal to frankly high PTH   Update levels  Recheck dexa   Never 24 hour urine - ordered   Stay hydrated   Criteria for surgery discussed          Relevant Orders    PTH, Intact    Calcium, Ionized    Calcium, Urine, 24 Hour - Urine, Clean Catch    Creatinine, Urine, 24 Hour - Urine, Clean Catch    Hypothyroidism     Check tfts          Relevant Orders    T4, Free    TSH       Genitourinary    Asymptomatic postmenopausal state     Update dexa - ordered          Relevant Orders    DEXA Bone Density Axial        .Return in about 6 months (around 5/18/2020) for Recheck 30 min .    Nela Kenyon MD  Signed Nela Kenyon MD

## 2019-11-19 ENCOUNTER — APPOINTMENT (OUTPATIENT)
Dept: LAB | Facility: HOSPITAL | Age: 69
End: 2019-11-19

## 2019-11-19 PROCEDURE — 82570 ASSAY OF URINE CREATININE: CPT | Performed by: INTERNAL MEDICINE

## 2019-11-19 PROCEDURE — 82340 ASSAY OF CALCIUM IN URINE: CPT | Performed by: INTERNAL MEDICINE

## 2019-11-19 PROCEDURE — 81050 URINALYSIS VOLUME MEASURE: CPT | Performed by: INTERNAL MEDICINE

## 2019-11-20 LAB
CALCIUM 24H UR-MCNC: 14.1 MG/DL
CALCIUM 24H UR-MRATE: 394.8 MG/24 HR (ref 100–300)
COLLECT DURATION TIME UR: 24 HRS
COLLECT DURATION TIME UR: 24 HRS
CREAT UR-MCNC: 39.2 MG/DL
CREATINE 24H UR-MRATE: 1.1 G/24 HR (ref 0.7–1.6)
SPECIMEN VOL 24H UR: 2800 ML
SPECIMEN VOL 24H UR: 2800 ML

## 2019-11-23 ENCOUNTER — TELEPHONE (OUTPATIENT)
Dept: ENDOCRINOLOGY | Facility: CLINIC | Age: 69
End: 2019-11-23

## 2019-11-23 RX ORDER — ATORVASTATIN CALCIUM 10 MG/1
10 TABLET, FILM COATED ORAL DAILY
Qty: 30 TABLET | Refills: 11 | Status: SHIPPED | OUTPATIENT
Start: 2019-11-23 | End: 2020-11-20 | Stop reason: SDUPTHER

## 2019-11-26 RX ORDER — LEVOTHYROXINE SODIUM 100 MCG
TABLET ORAL
Qty: 90 TABLET | Refills: 1 | Status: SHIPPED | OUTPATIENT
Start: 2019-11-26 | End: 2020-06-01 | Stop reason: SDUPTHER

## 2020-03-04 ENCOUNTER — HOSPITAL ENCOUNTER (OUTPATIENT)
Dept: BONE DENSITY | Facility: HOSPITAL | Age: 70
Discharge: HOME | End: 2020-03-04
Admitting: INTERNAL MEDICINE

## 2020-03-04 PROCEDURE — 77080 DXA BONE DENSITY AXIAL: CPT

## 2020-04-21 ENCOUNTER — TELEPHONE (OUTPATIENT)
Dept: ENDOCRINOLOGY | Facility: CLINIC | Age: 70
End: 2020-04-21

## 2020-04-21 DIAGNOSIS — N63.20 LEFT BREAST LUMP: Primary | ICD-10-CM

## 2020-04-21 DIAGNOSIS — N64.59 OTHER SIGNS AND SYMPTOMS IN BREAST: ICD-10-CM

## 2020-04-23 ENCOUNTER — TELEPHONE (OUTPATIENT)
Dept: ENDOCRINOLOGY | Facility: CLINIC | Age: 70
End: 2020-04-23

## 2020-04-23 DIAGNOSIS — N63.20 LEFT BREAST LUMP: Primary | ICD-10-CM

## 2020-04-23 DIAGNOSIS — N64.89 OTHER SPECIFIED DISORDERS OF BREAST: ICD-10-CM

## 2020-05-05 ENCOUNTER — HOSPITAL ENCOUNTER (OUTPATIENT)
Dept: MAMMOGRAPHY | Facility: HOSPITAL | Age: 70
Discharge: HOME | End: 2020-05-05
Admitting: INTERNAL MEDICINE

## 2020-05-05 ENCOUNTER — HOSPITAL ENCOUNTER (OUTPATIENT)
Dept: ULTRASOUND IMAGING | Facility: HOSPITAL | Age: 70
Discharge: HOME | End: 2020-05-05

## 2020-05-05 ENCOUNTER — TELEPHONE (OUTPATIENT)
Dept: ENDOCRINOLOGY | Facility: CLINIC | Age: 70
End: 2020-05-05

## 2020-05-05 DIAGNOSIS — N64.89 OTHER SPECIFIED DISORDERS OF BREAST: ICD-10-CM

## 2020-05-05 DIAGNOSIS — E03.9 ACQUIRED HYPOTHYROIDISM: ICD-10-CM

## 2020-05-05 DIAGNOSIS — N63.20 LEFT BREAST LUMP: ICD-10-CM

## 2020-05-05 DIAGNOSIS — E55.9 VITAMIN D DEFICIENCY: ICD-10-CM

## 2020-05-05 DIAGNOSIS — E21.0 PRIMARY HYPERPARATHYROIDISM (HCC): ICD-10-CM

## 2020-05-05 DIAGNOSIS — E78.00 PURE HYPERCHOLESTEROLEMIA: Primary | ICD-10-CM

## 2020-05-05 PROCEDURE — 77066 DX MAMMO INCL CAD BI: CPT

## 2020-05-05 PROCEDURE — 76642 ULTRASOUND BREAST LIMITED: CPT

## 2020-05-05 PROCEDURE — 77066 DX MAMMO INCL CAD BI: CPT | Performed by: RADIOLOGY

## 2020-05-05 PROCEDURE — 76642 ULTRASOUND BREAST LIMITED: CPT | Performed by: RADIOLOGY

## 2020-05-05 PROCEDURE — G0279 TOMOSYNTHESIS, MAMMO: HCPCS

## 2020-05-05 PROCEDURE — G0279 TOMOSYNTHESIS, MAMMO: HCPCS | Performed by: RADIOLOGY

## 2020-05-05 NOTE — TELEPHONE ENCOUNTER
PT HAS AN APPT ON 5/21 SHE WANTED TO GET LABS DRAWN FIRST     PLEASE PLACE ORDER IN CHART AND LET THE PT KNOW ITS IN THERE--SHE WANTED TO GO TO [place] FOR THIS    PLEASE CALL [phone]

## 2020-05-15 ENCOUNTER — APPOINTMENT (OUTPATIENT)
Dept: LAB | Facility: HOSPITAL | Age: 70
End: 2020-05-15

## 2020-05-15 LAB
ALBUMIN SERPL-MCNC: 4.5 G/DL (ref 3.5–5.2)
ALBUMIN/GLOB SERPL: 1.5 G/DL
ALP SERPL-CCNC: 80 U/L (ref 39–117)
ALT SERPL W P-5'-P-CCNC: 16 U/L (ref 1–33)
ANION GAP SERPL CALCULATED.3IONS-SCNC: 9.9 MMOL/L (ref 5–15)
AST SERPL-CCNC: 20 U/L (ref 1–32)
BILIRUB SERPL-MCNC: 0.6 MG/DL (ref 0.2–1.2)
BUN BLD-MCNC: 20 MG/DL (ref 8–23)
BUN/CREAT SERPL: 21.7 (ref 7–25)
CA-I BLD-MCNC: 5.6 MG/DL (ref 4.6–5.4)
CA-I SERPL ISE-MCNC: 1.4 MMOL/L (ref 1.15–1.35)
CALCIUM SPEC-SCNC: 10.1 MG/DL (ref 8.6–10.5)
CHLORIDE SERPL-SCNC: 100 MMOL/L (ref 98–107)
CHOLEST SERPL-MCNC: 292 MG/DL (ref 0–200)
CO2 SERPL-SCNC: 28.1 MMOL/L (ref 22–29)
CREAT BLD-MCNC: 0.92 MG/DL (ref 0.57–1)
GFR SERPL CREATININE-BSD FRML MDRD: 61 ML/MIN/1.73
GLOBULIN UR ELPH-MCNC: 3 GM/DL
GLUCOSE BLD-MCNC: 96 MG/DL (ref 65–99)
HDLC SERPL-MCNC: 89 MG/DL (ref 40–60)
LDLC SERPL CALC-MCNC: 184 MG/DL (ref 0–100)
LDLC/HDLC SERPL: 2.07 {RATIO}
POTASSIUM BLD-SCNC: 4.4 MMOL/L (ref 3.5–5.2)
PROT SERPL-MCNC: 7.5 G/DL (ref 6–8.5)
SODIUM BLD-SCNC: 138 MMOL/L (ref 136–145)
T4 FREE SERPL-MCNC: 1.4 NG/DL (ref 0.93–1.7)
TRIGL SERPL-MCNC: 93 MG/DL (ref 0–150)
TSH SERPL DL<=0.05 MIU/L-ACNC: 1.19 UIU/ML (ref 0.27–4.2)
VLDLC SERPL-MCNC: 18.6 MG/DL (ref 5–40)

## 2020-05-15 PROCEDURE — 84439 ASSAY OF FREE THYROXINE: CPT | Performed by: INTERNAL MEDICINE

## 2020-05-15 PROCEDURE — 80053 COMPREHEN METABOLIC PANEL: CPT | Performed by: INTERNAL MEDICINE

## 2020-05-15 PROCEDURE — 80061 LIPID PANEL: CPT | Performed by: INTERNAL MEDICINE

## 2020-05-15 PROCEDURE — 84443 ASSAY THYROID STIM HORMONE: CPT | Performed by: INTERNAL MEDICINE

## 2020-05-15 PROCEDURE — 82330 ASSAY OF CALCIUM: CPT | Performed by: INTERNAL MEDICINE

## 2020-05-21 ENCOUNTER — TELEMEDICINE (OUTPATIENT)
Dept: ENDOCRINOLOGY | Facility: CLINIC | Age: 70
End: 2020-05-21

## 2020-05-21 DIAGNOSIS — E83.52 HYPERCALCEMIA: Primary | ICD-10-CM

## 2020-05-21 DIAGNOSIS — E03.9 ACQUIRED HYPOTHYROIDISM: ICD-10-CM

## 2020-05-21 DIAGNOSIS — E78.00 PURE HYPERCHOLESTEROLEMIA: ICD-10-CM

## 2020-05-21 DIAGNOSIS — E55.9 VITAMIN D DEFICIENCY: ICD-10-CM

## 2020-05-21 PROCEDURE — 99213 OFFICE O/P EST LOW 20 MIN: CPT | Performed by: INTERNAL MEDICINE

## 2020-05-21 NOTE — PROGRESS NOTES
Beverley Yoo 69 y.o.  CC: Follow-up; Hypothyroidism; Hyperlipidemia; and Vitamin D Deficiency      [place]: Follow-up; Hypothyroidism; Hyperlipidemia; and Vitamin D Deficiency    This was an audio and video enabled telemedicine encounter conducted with patient consent and in compliance with patient concerns about safety during pandemic    Is on lipitor 10 mg daily   Is on omega 3 FA  Is on synthroid 100 mcg daily  Is doing pilates for exercise  Does not need refills currently   Stopped patches- using only vag estrogen  Went for mamm - recc MRI - she does not want to do this   U/s neg     No Known Allergies    Current Outpatient Medications:   •  atorvastatin (LIPITOR) 10 MG tablet, Take 1 tablet by mouth Daily., Disp: 30 tablet, Rfl: 11  •  Calcium-Magnesium-Vitamin D (CALCIUM 500 PO), Take  by mouth 2 (Two) Times a Day., Disp: , Rfl:   •  estradiol (VAGIFEM) 10 MCG tablet vaginal tablet, Insert 1 tablet vaginally 3 times per week, Disp: 48 tablet, Rfl: 1  •  Fish Oil-Cholecalciferol (OMEGA-3 + D PO), Take  by mouth 2 (Two) Times a Day., Disp: , Rfl:   •  FLUZONE HIGH-DOSE 0.5 ML suspension prefilled syringe injection, , Disp: , Rfl: 0  •  Multiple Vitamins-Minerals (MULTIVITAMIN ADULTS PO), Take  by mouth., Disp: , Rfl:   •  SYNTHROID 100 MCG tablet, TAKE 1 TABLET DAILY, Disp: 90 tablet, Rfl: 1  •  tretinoin (RETIN-A) 0.05 % cream, ANALIA AA HS, Disp: , Rfl: 11  •  VIVELLE-DOT 0.025 MG/24HR patch, APPLY 1 PATCH ON THE SKIN AS DIRECTED BY PROVIDER 2 TIMES WEEKLY, Disp: 24 patch, Rfl: 3  Patient Active Problem List    Diagnosis   • Left breast lump [N63.20]   • Asymptomatic postmenopausal state [Z78.0]   • Urinary, incontinence, stress female [N39.3]   • Pyriformis syndrome, unspecified laterality [G57.00]   • PAC (premature atrial contraction) [I49.1]   • Acquired hallux rigidus of left foot [M20.22]   • 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        Occ evening fatigue   HENT: Negative for congestion and rhinorrhea.    Respiratory: Negative for cough and shortness of breath.    Cardiovascular: Negative for chest pain and palpitations.   Endocrine: Negative for cold intolerance.   Musculoskeletal: Negative for arthralgias and myalgias.   Skin: Negative for color change and rash.   Neurological: Negative for dizziness and light-headedness.   Psychiatric/Behavioral: Negative for dysphoric mood. The patient is not nervous/anxious.      Social History     Socioeconomic History   • Marital status:      Spouse name: Not on file   • Number of children: Not on file   • Years of education: Not on file   • Highest education level: Not on file   Tobacco Use   • Smoking status: Never Smoker   • Smokeless tobacco: Never Used   Substance and Sexual Activity   • Alcohol use: Yes     Types: 4 Glasses of wine per week     Comment: 1 glass of wine 5 days per week   • Drug use: No   • Sexual activity: Yes     Partners: Male     Birth control/protection: Post-menopausal, Surgical     Family History   Problem Relation Age of Onset   • Cancer Paternal Grandfather         BREAST    • Breast cancer Mother 60   • Cancer Mother         Breast CA   • Thyroid disease Mother    • Breast cancer Sister 60   • Breast cancer Maternal Grandmother 60   • Cancer Maternal Grandmother         Breast CA   • Cancer Father    • Anemia Father    • Hyperlipidemia Father    • Cancer Sister         Breast CA   • Ovarian cancer Neg Hx      There were no vitals taken for this visit.  Physical Exam    Constitutional: She is oriented to person, place, and time. She appears well-developed and well-nourished.   HENT:   Head: Normocephalic and atraumatic.   Eyes: Pupils are equal, round, and reactive to light.   Neck: Normal range of motion.   Pulmonary/Chest: Effort normal. No respiratory distress.   Musculoskeletal: Normal range of motion.   Neurological: She is alert and oriented to person, place, and time.   Skin: Skin is warm and dry.   Psychiatric: She has a normal mood and affect. Her behavior is normal. Judgment and thought content normal.     Results for orders placed or performed in visit on 05/05/20   Comprehensive Metabolic Panel   Result Value Ref Range    Glucose 96 65 - 99 mg/dL    BUN 20 8 - 23 mg/dL    Creatinine 0.92 0.57 - 1.00 mg/dL    Sodium 138 136 - 145 mmol/L    Potassium 4.4 3.5 - 5.2 mmol/L    Chloride 100 98 - 107 mmol/L    CO2 28.1 22.0 - 29.0 mmol/L    Calcium 10.1 8.6 - 10.5 mg/dL    Total Protein 7.5 6.0 - 8.5 g/dL    Albumin 4.50 3.50 - 5.20 g/dL    ALT (SGPT) 16 1 - 33 U/L    AST (SGOT) 20 1 - 32 U/L    Alkaline Phosphatase 80 39 - 117 U/L    Total Bilirubin 0.6 0.2 - 1.2 mg/dL    eGFR Non African Amer 61 >60 mL/min/1.73    Globulin 3.0 gm/dL    A/G Ratio 1.5 g/dL    BUN/Creatinine Ratio 21.7 7.0 - 25.0    Anion Gap 9.9 5.0 - 15.0 mmol/L   Lipid Panel   Result Value Ref Range    Total Cholesterol 292 (H) 0 - 200 mg/dL    Triglycerides 93 0 - 150 mg/dL    HDL Cholesterol 89 (H) 40 - 60 mg/dL    LDL Cholesterol  184 (H) 0 - 100 mg/dL    VLDL Cholesterol 18.6 5 - 40 mg/dL    LDL/HDL Ratio 2.07    T4, Free   Result Value Ref Range    Free T4 1.40 0.93 - 1.70 ng/dL   TSH   Result Value Ref Range    TSH 1.190 0.270 - 4.200 uIU/mL   Calcium, Ionized   Result Value Ref Range    Ionized Calcium 1.40 (H) 1.15 - 1.35 mmol/L    Ionized Calcium 5.6 (H) 4.6 - 5.4 mg/dL       Problem List Items Addressed This Visit        Cardiovascular and Mediastinum    Hyperlipidemia     High chol  On low fat  diet  Stays active   Continue to monitor             Digestive    Vitamin D deficiency     Continue low dose supplement               Endocrine    Hypothyroidism     Reviewed recent tfts with her   Continue current supplement and monitoring             Other    Hypercalcemia - Primary     With PTH 50-60, elevated ur calcium (over 300) and normal D  Ion ca stable at 1.4  A lot of bone and joint pain and we discussed options including parathyroidectomy  Indications for surgical resection reviewed  She would like to continue to monitor for now but we can make referral any time as needed              Return in about 6 months (around 11/21/2020).    Nela Kenyon MD  Signed Nela Kenyon MD

## 2020-05-21 NOTE — ASSESSMENT & PLAN NOTE
With PTH 50-60, elevated ur calcium (over 300) and normal D  Ion ca stable at 1.4  A lot of bone and joint pain and we discussed options including parathyroidectomy  Indications for surgical resection reviewed  She would like to continue to monitor for now but we can make referral any time as needed

## 2020-06-01 RX ORDER — ESTRADIOL 10 UG/1
INSERT VAGINAL
Qty: 48 TABLET | Refills: 1 | OUTPATIENT
Start: 2020-06-01

## 2020-06-01 RX ORDER — LEVOTHYROXINE SODIUM 100 MCG
100 TABLET ORAL DAILY
Qty: 90 TABLET | Refills: 1 | Status: SHIPPED | OUTPATIENT
Start: 2020-06-01 | End: 2020-11-19 | Stop reason: SDUPTHER

## 2020-06-03 RX ORDER — ESTRADIOL 10 UG/1
INSERT VAGINAL
Qty: 48 TABLET | Refills: 1 | Status: SHIPPED | OUTPATIENT
Start: 2020-06-03 | End: 2021-02-15

## 2020-11-11 ENCOUNTER — TELEPHONE (OUTPATIENT)
Dept: ENDOCRINOLOGY | Facility: CLINIC | Age: 70
End: 2020-11-11

## 2020-11-11 DIAGNOSIS — E21.0 PRIMARY HYPERPARATHYROIDISM (HCC): ICD-10-CM

## 2020-11-11 DIAGNOSIS — E78.00 PURE HYPERCHOLESTEROLEMIA: Primary | ICD-10-CM

## 2020-11-11 DIAGNOSIS — E03.9 ACQUIRED HYPOTHYROIDISM: ICD-10-CM

## 2020-11-16 ENCOUNTER — LAB (OUTPATIENT)
Dept: LAB | Facility: HOSPITAL | Age: 70
End: 2020-11-16

## 2020-11-16 LAB
25(OH)D3 SERPL-MCNC: 52.3 NG/ML (ref 30–100)
ALBUMIN SERPL-MCNC: 4.7 G/DL (ref 3.5–5.2)
ALBUMIN/GLOB SERPL: 1.9 G/DL
ALP SERPL-CCNC: 79 U/L (ref 39–117)
ALT SERPL W P-5'-P-CCNC: 17 U/L (ref 1–33)
ANION GAP SERPL CALCULATED.3IONS-SCNC: 8.8 MMOL/L (ref 5–15)
AST SERPL-CCNC: 21 U/L (ref 1–32)
BASOPHILS # BLD AUTO: 0.04 10*3/MM3 (ref 0–0.2)
BASOPHILS NFR BLD AUTO: 0.9 % (ref 0–1.5)
BILIRUB SERPL-MCNC: 0.3 MG/DL (ref 0–1.2)
BUN SERPL-MCNC: 16 MG/DL (ref 8–23)
BUN/CREAT SERPL: 19.3 (ref 7–25)
CA-I BLD-MCNC: 5.9 MG/DL (ref 4.6–5.4)
CA-I SERPL ISE-MCNC: 1.47 MMOL/L (ref 1.15–1.35)
CALCIUM SPEC-SCNC: 10.6 MG/DL (ref 8.6–10.5)
CHLORIDE SERPL-SCNC: 104 MMOL/L (ref 98–107)
CHOLEST SERPL-MCNC: 281 MG/DL (ref 0–200)
CO2 SERPL-SCNC: 27.2 MMOL/L (ref 22–29)
CREAT SERPL-MCNC: 0.83 MG/DL (ref 0.57–1)
DEPRECATED RDW RBC AUTO: 38.1 FL (ref 37–54)
EOSINOPHIL # BLD AUTO: 0.11 10*3/MM3 (ref 0–0.4)
EOSINOPHIL NFR BLD AUTO: 2.4 % (ref 0.3–6.2)
ERYTHROCYTE [DISTWIDTH] IN BLOOD BY AUTOMATED COUNT: 11.6 % (ref 12.3–15.4)
GFR SERPL CREATININE-BSD FRML MDRD: 68 ML/MIN/1.73
GLOBULIN UR ELPH-MCNC: 2.5 GM/DL
GLUCOSE SERPL-MCNC: 97 MG/DL (ref 65–99)
HCT VFR BLD AUTO: 40.6 % (ref 34–46.6)
HDLC SERPL-MCNC: 82 MG/DL (ref 40–60)
HGB BLD-MCNC: 13.9 G/DL (ref 12–15.9)
IMM GRANULOCYTES # BLD AUTO: 0.01 10*3/MM3 (ref 0–0.05)
IMM GRANULOCYTES NFR BLD AUTO: 0.2 % (ref 0–0.5)
LDLC SERPL CALC-MCNC: 171 MG/DL (ref 0–100)
LDLC/HDLC SERPL: 2.04 {RATIO}
LYMPHOCYTES # BLD AUTO: 1.62 10*3/MM3 (ref 0.7–3.1)
LYMPHOCYTES NFR BLD AUTO: 34.8 % (ref 19.6–45.3)
MCH RBC QN AUTO: 31.1 PG (ref 26.6–33)
MCHC RBC AUTO-ENTMCNC: 34.2 G/DL (ref 31.5–35.7)
MCV RBC AUTO: 90.8 FL (ref 79–97)
MONOCYTES # BLD AUTO: 0.67 10*3/MM3 (ref 0.1–0.9)
MONOCYTES NFR BLD AUTO: 14.4 % (ref 5–12)
NEUTROPHILS NFR BLD AUTO: 2.21 10*3/MM3 (ref 1.7–7)
NEUTROPHILS NFR BLD AUTO: 47.3 % (ref 42.7–76)
NRBC BLD AUTO-RTO: 0 /100 WBC (ref 0–0.2)
PLATELET # BLD AUTO: 257 10*3/MM3 (ref 140–450)
PMV BLD AUTO: 10.9 FL (ref 6–12)
POTASSIUM SERPL-SCNC: 4.4 MMOL/L (ref 3.5–5.2)
PROT SERPL-MCNC: 7.2 G/DL (ref 6–8.5)
PTH-INTACT SERPL-MCNC: 44.1 PG/ML (ref 15–65)
RBC # BLD AUTO: 4.47 10*6/MM3 (ref 3.77–5.28)
SODIUM SERPL-SCNC: 140 MMOL/L (ref 136–145)
T4 FREE SERPL-MCNC: 1.49 NG/DL (ref 0.93–1.7)
TRIGL SERPL-MCNC: 159 MG/DL (ref 0–150)
TSH SERPL DL<=0.05 MIU/L-ACNC: 0.46 UIU/ML (ref 0.27–4.2)
VLDLC SERPL-MCNC: 28 MG/DL (ref 5–40)
WBC # BLD AUTO: 4.66 10*3/MM3 (ref 3.4–10.8)

## 2020-11-16 PROCEDURE — 84439 ASSAY OF FREE THYROXINE: CPT | Performed by: INTERNAL MEDICINE

## 2020-11-16 PROCEDURE — 82330 ASSAY OF CALCIUM: CPT | Performed by: INTERNAL MEDICINE

## 2020-11-16 PROCEDURE — 85025 COMPLETE CBC W/AUTO DIFF WBC: CPT | Performed by: INTERNAL MEDICINE

## 2020-11-16 PROCEDURE — 80061 LIPID PANEL: CPT | Performed by: INTERNAL MEDICINE

## 2020-11-16 PROCEDURE — 83970 ASSAY OF PARATHORMONE: CPT | Performed by: INTERNAL MEDICINE

## 2020-11-16 PROCEDURE — 82306 VITAMIN D 25 HYDROXY: CPT | Performed by: INTERNAL MEDICINE

## 2020-11-16 PROCEDURE — 80053 COMPREHEN METABOLIC PANEL: CPT | Performed by: INTERNAL MEDICINE

## 2020-11-16 PROCEDURE — 84443 ASSAY THYROID STIM HORMONE: CPT | Performed by: INTERNAL MEDICINE

## 2020-11-19 ENCOUNTER — OFFICE VISIT (OUTPATIENT)
Dept: ENDOCRINOLOGY | Facility: CLINIC | Age: 70
End: 2020-11-19

## 2020-11-19 VITALS
SYSTOLIC BLOOD PRESSURE: 118 MMHG | OXYGEN SATURATION: 99 % | HEIGHT: 65 IN | HEART RATE: 89 BPM | DIASTOLIC BLOOD PRESSURE: 78 MMHG | WEIGHT: 130.8 LBS | BODY MASS INDEX: 21.79 KG/M2

## 2020-11-19 DIAGNOSIS — E03.9 ACQUIRED HYPOTHYROIDISM: ICD-10-CM

## 2020-11-19 DIAGNOSIS — R92.8 MAMMOGRAM ABNORMAL: ICD-10-CM

## 2020-11-19 DIAGNOSIS — N63.20 LEFT BREAST LUMP: ICD-10-CM

## 2020-11-19 DIAGNOSIS — E78.00 PURE HYPERCHOLESTEROLEMIA: Primary | ICD-10-CM

## 2020-11-19 DIAGNOSIS — E21.0 PRIMARY HYPERPARATHYROIDISM (HCC): ICD-10-CM

## 2020-11-19 DIAGNOSIS — E55.9 VITAMIN D DEFICIENCY: ICD-10-CM

## 2020-11-19 PROCEDURE — 99214 OFFICE O/P EST MOD 30 MIN: CPT | Performed by: INTERNAL MEDICINE

## 2020-11-19 RX ORDER — LEVOTHYROXINE SODIUM 100 MCG
100 TABLET ORAL DAILY
Qty: 90 TABLET | Refills: 1 | Status: SHIPPED | OUTPATIENT
Start: 2020-11-19 | End: 2021-05-17

## 2020-11-19 NOTE — ASSESSMENT & PLAN NOTE
Primary hpt   Reviewed recent calcium, D   Will continue to monitor and follow dexa   No other factors which would push toward surgery for this currently

## 2020-11-19 NOTE — PROGRESS NOTES
Beverley Yoo 70 y.o.  CC:Follow-up, Hypothyroidism, Hyperlipidemia, and Vitamin D Deficiency      [place]: Follow-up, Hypothyroidism, Hyperlipidemia, and Vitamin D Deficiency    Is on synthroid 100 mcg daily   bp is good  Is on low fat diet, lipitor 10 mg daily   Is on vitamin D supplement   Is exercising more   Left breast lump growing- mamm neg but strong fam h/o breast cancer    No Known Allergies    Current Outpatient Medications:   •  atorvastatin (LIPITOR) 10 MG tablet, Take 1 tablet by mouth Daily., Disp: 30 tablet, Rfl: 11  •  Calcium-Magnesium-Vitamin D (CALCIUM 500 PO), Take  by mouth 2 (Two) Times a Day., Disp: , Rfl:   •  estradiol (VAGIFEM) 10 MCG tablet vaginal tablet, Insert 1 tablet vaginally 3 times per week, Disp: 48 tablet, Rfl: 1  •  Fish Oil-Cholecalciferol (OMEGA-3 + D PO), Take  by mouth 2 (Two) Times a Day., Disp: , Rfl:   •  FLUZONE HIGH-DOSE 0.5 ML suspension prefilled syringe injection, , Disp: , Rfl: 0  •  Multiple Vitamins-Minerals (MULTIVITAMIN ADULTS PO), Take  by mouth., Disp: , Rfl:   •  SYNTHROID 100 MCG tablet, Take 1 tablet by mouth Daily., Disp: 90 tablet, Rfl: 1  •  tretinoin (RETIN-A) 0.05 % cream, ANALIA AA HS, Disp: , Rfl: 11  Patient Active Problem List    Diagnosis   • Left breast lump [N63.20]   • Asymptomatic postmenopausal state [Z78.0]   • Urinary, incontinence, stress female [N39.3]   • Pyriformis syndrome, unspecified laterality [G57.00]   • PAC (premature atrial contraction) [I49.1]   • Acquired hallux rigidus of left foot [M20.22]   • 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   HENT: Negative for congestion, dental problem, drooling, ear discharge, ear pain, facial swelling, hearing loss, mouth sores, nosebleeds, postnasal drip, rhinorrhea, sinus pressure, sneezing, sore throat, tinnitus, trouble swallowing and voice change.    Eyes: Negative for photophobia, pain, discharge, redness, itching and visual disturbance.   Respiratory: Negative for apnea, cough, choking, chest tightness, shortness of breath, wheezing and stridor.    Cardiovascular: Negative for chest pain, palpitations and leg swelling.   Gastrointestinal: Negative for abdominal distention, abdominal pain, anal bleeding, blood in stool, constipation, diarrhea, nausea, rectal pain and vomiting.   Endocrine: Negative for cold intolerance, heat intolerance, polydipsia, polyphagia and polyuria.   Genitourinary: Negative for decreased urine volume, difficulty urinating, dysuria, enuresis, flank pain, frequency, genital sores, hematuria and urgency.        Left breast lump    Musculoskeletal: Negative for arthralgias, back pain, gait problem, joint swelling, myalgias, neck pain and neck stiffness.   Skin: Negative for color change, pallor, rash and wound.   Allergic/Immunologic: Negative for environmental allergies, food allergies and immunocompromised state.   Neurological: Negative for dizziness, tremors, seizures, syncope, facial asymmetry, speech difficulty, weakness, light-headedness, numbness and headaches.   Hematological: Negative for adenopathy. Does not bruise/bleed easily.   Psychiatric/Behavioral: Negative for agitation, behavioral problems, confusion, decreased concentration, dysphoric mood, hallucinations, self-injury, sleep disturbance and suicidal ideas. The patient is not nervous/anxious and is not hyperactive.      Social History     Socioeconomic History   • Marital status:  "     Spouse name: Not on file   • Number of children: Not on file   • Years of education: Not on file   • Highest education level: Not on file   Tobacco Use   • Smoking status: Never Smoker   • Smokeless tobacco: Never Used   Substance and Sexual Activity   • Alcohol use: Yes     Types: 4 Glasses of wine per week     Comment: 1 glass of wine 5 days per week   • Drug use: No   • Sexual activity: Yes     Partners: Male     Birth control/protection: Post-menopausal, Surgical     Family History   Problem Relation Age of Onset   • Cancer Paternal Grandfather         BREAST    • Breast cancer Mother 60   • Cancer Mother         Breast CA   • Thyroid disease Mother    • Breast cancer Sister 60   • Breast cancer Maternal Grandmother 60   • Cancer Maternal Grandmother         Breast CA   • Cancer Father    • Anemia Father    • Hyperlipidemia Father    • Cancer Sister         Breast CA   • Ovarian cancer Neg Hx      /78   Pulse 89   Ht 165.1 cm (65\")   Wt 59.3 kg (130 lb 12.8 oz)   SpO2 99%   BMI 21.77 kg/m²   Physical Exam  Vitals signs and nursing note reviewed.   Constitutional:       Appearance: She is well-developed.   HENT:      Head: Normocephalic and atraumatic.   Eyes:      General: Lids are normal.      Extraocular Movements: Extraocular movements intact.      Conjunctiva/sclera: Conjunctivae normal.      Pupils: Pupils are equal, round, and reactive to light.   Neck:      Musculoskeletal: Normal range of motion and neck supple.      Thyroid: No thyroid mass or thyromegaly.      Vascular: No carotid bruit.      Trachea: Trachea normal. No tracheal deviation.   Cardiovascular:      Rate and Rhythm: Normal rate and regular rhythm.      Heart sounds: Normal heart sounds. No murmur. No friction rub. No gallop.    Pulmonary:      Effort: Pulmonary effort is normal. No respiratory distress.      Breath sounds: Normal breath sounds. No wheezing.   Musculoskeletal: Normal range of motion.         General: " No deformity.   Lymphadenopathy:      Cervical: No cervical adenopathy.   Skin:     General: Skin is warm and dry.      Findings: No erythema or rash.      Nails: There is no clubbing.     Neurological:      Mental Status: She is alert and oriented to person, place, and time.      Cranial Nerves: No cranial nerve deficit.      Deep Tendon Reflexes: Reflexes are normal and symmetric. Reflexes normal.   Psychiatric:         Speech: Speech normal.         Behavior: Behavior normal.         Thought Content: Thought content normal.         Judgment: Judgment normal.       Results for orders placed or performed in visit on 11/11/20   Comprehensive Metabolic Panel    Specimen: Blood   Result Value Ref Range    Glucose 97 65 - 99 mg/dL    BUN 16 8 - 23 mg/dL    Creatinine 0.83 0.57 - 1.00 mg/dL    Sodium 140 136 - 145 mmol/L    Potassium 4.4 3.5 - 5.2 mmol/L    Chloride 104 98 - 107 mmol/L    CO2 27.2 22.0 - 29.0 mmol/L    Calcium 10.6 (H) 8.6 - 10.5 mg/dL    Total Protein 7.2 6.0 - 8.5 g/dL    Albumin 4.70 3.50 - 5.20 g/dL    ALT (SGPT) 17 1 - 33 U/L    AST (SGOT) 21 1 - 32 U/L    Alkaline Phosphatase 79 39 - 117 U/L    Total Bilirubin 0.3 0.0 - 1.2 mg/dL    eGFR Non African Amer 68 >60 mL/min/1.73    Globulin 2.5 gm/dL    A/G Ratio 1.9 g/dL    BUN/Creatinine Ratio 19.3 7.0 - 25.0    Anion Gap 8.8 5.0 - 15.0 mmol/L   CBC Auto Differential    Specimen: Blood   Result Value Ref Range    WBC 4.66 3.40 - 10.80 10*3/mm3    RBC 4.47 3.77 - 5.28 10*6/mm3    Hemoglobin 13.9 12.0 - 15.9 g/dL    Hematocrit 40.6 34.0 - 46.6 %    MCV 90.8 79.0 - 97.0 fL    MCH 31.1 26.6 - 33.0 pg    MCHC 34.2 31.5 - 35.7 g/dL    RDW 11.6 (L) 12.3 - 15.4 %    RDW-SD 38.1 37.0 - 54.0 fl    MPV 10.9 6.0 - 12.0 fL    Platelets 257 140 - 450 10*3/mm3    Neutrophil % 47.3 42.7 - 76.0 %    Lymphocyte % 34.8 19.6 - 45.3 %    Monocyte % 14.4 (H) 5.0 - 12.0 %    Eosinophil % 2.4 0.3 - 6.2 %    Basophil % 0.9 0.0 - 1.5 %    Immature Grans % 0.2 0.0 - 0.5 %     Neutrophils, Absolute 2.21 1.70 - 7.00 10*3/mm3    Lymphocytes, Absolute 1.62 0.70 - 3.10 10*3/mm3    Monocytes, Absolute 0.67 0.10 - 0.90 10*3/mm3    Eosinophils, Absolute 0.11 0.00 - 0.40 10*3/mm3    Basophils, Absolute 0.04 0.00 - 0.20 10*3/mm3    Immature Grans, Absolute 0.01 0.00 - 0.05 10*3/mm3    nRBC 0.0 0.0 - 0.2 /100 WBC   Lipid Panel    Specimen: Blood   Result Value Ref Range    Total Cholesterol 281 (H) 0 - 200 mg/dL    Triglycerides 159 (H) 0 - 150 mg/dL    HDL Cholesterol 82 (H) 40 - 60 mg/dL    LDL Cholesterol  171 (H) 0 - 100 mg/dL    VLDL Cholesterol 28 5 - 40 mg/dL    LDL/HDL Ratio 2.04    TSH    Specimen: Blood   Result Value Ref Range    TSH 0.457 0.270 - 4.200 uIU/mL   T4, Free    Specimen: Blood   Result Value Ref Range    Free T4 1.49 0.93 - 1.70 ng/dL   Calcium, Ionized    Specimen: Blood   Result Value Ref Range    Ionized Calcium 1.47 (H) 1.15 - 1.35 mmol/L    Ionized Calcium 5.9 (H) 4.6 - 5.4 mg/dL   PTH, Intact    Specimen: Blood   Result Value Ref Range    PTH, Intact 44.1 15.0 - 65.0 pg/mL   Vitamin D 25 Hydroxy    Specimen: Blood   Result Value Ref Range    25 Hydroxy, Vitamin D 52.3 30.0 - 100.0 ng/ml     Problems Addressed this Visit        Cardiovascular and Mediastinum    Hyperlipidemia - Primary     Recommended start lipitor 10 mg every other day  10 year cv risk 7.5%   Update levels next ov             Digestive    Vitamin D deficiency     Is on vitamin D supplement   Level stable- continue supplement               Endocrine    Primary hyperparathyroidism (CMS/HCC)     Primary hpt   Reviewed recent calcium, D   Will continue to monitor and follow dexa   No other factors which would push toward surgery for this currently          Hypothyroidism     Is on synthroid 100 mcg daily   Normal TSH          Relevant Medications    Synthroid 100 MCG tablet       Other    Mammogram abnormal     No lesion on mamm corresponding to abnormality but dense breasts           Relevant Orders     Ambulatory Referral to General Surgery (Completed)    Left breast lump     Had MRI - neg but dense breasts  She is concerned that this area is enlarging   States [facility] h/o breast cancer in multiple [facility] members (mother, sister)  Refer breast surgery for evaluation          Relevant Orders    Ambulatory Referral to General Surgery (Completed)      Diagnoses       Codes Comments    Pure hypercholesterolemia    -  Primary ICD-10-CM: E78.00  ICD-9-CM: 272.0     Vitamin D deficiency     ICD-10-CM: E55.9  ICD-9-CM: 268.9     Acquired hypothyroidism     ICD-10-CM: E03.9  ICD-9-CM: 244.9     Primary hyperparathyroidism (CMS/HCC)     ICD-10-CM: E21.0  ICD-9-CM: 252.01     Mammogram abnormal     ICD-10-CM: R92.8  ICD-9-CM: 793.80     Left breast lump     ICD-10-CM: N63.20  ICD-9-CM: 611.72         Return in about 6 months (around 5/19/2021) for Recheck.    Sheryl Rivas MA  Signed Nela Kenyon MD

## 2020-11-19 NOTE — ASSESSMENT & PLAN NOTE
Had MRI - neg but dense breasts  She is concerned that this area is enlarging   States sig [facility] h/o breast cancer in multiple [facility] members (mother, sister)  Refer breast surgery for evaluation

## 2020-11-20 RX ORDER — ATORVASTATIN CALCIUM 10 MG/1
10 TABLET, FILM COATED ORAL DAILY
Qty: 30 TABLET | Refills: 11 | Status: SHIPPED | OUTPATIENT
Start: 2020-11-20 | End: 2021-12-18

## 2021-01-18 ENCOUNTER — IMMUNIZATION (OUTPATIENT)
Dept: VACCINE CLINIC | Facility: HOSPITAL | Age: 71
End: 2021-01-18

## 2021-01-18 PROCEDURE — 0001A: CPT | Performed by: FAMILY MEDICINE

## 2021-01-18 PROCEDURE — 91300 HC SARSCOV02 VAC 30MCG/0.3ML IM: CPT | Performed by: FAMILY MEDICINE

## 2021-02-08 ENCOUNTER — IMMUNIZATION (OUTPATIENT)
Dept: VACCINE CLINIC | Facility: HOSPITAL | Age: 71
End: 2021-02-08

## 2021-02-08 PROCEDURE — 0002A: CPT | Performed by: INTERNAL MEDICINE

## 2021-02-08 PROCEDURE — 91300 HC SARSCOV02 VAC 30MCG/0.3ML IM: CPT | Performed by: INTERNAL MEDICINE

## 2021-02-15 RX ORDER — ESTRADIOL 10 UG/1
INSERT VAGINAL
Qty: 48 TABLET | Refills: 3 | Status: SHIPPED | OUTPATIENT
Start: 2021-02-15 | End: 2023-01-09 | Stop reason: SDUPTHER

## 2021-03-30 ENCOUNTER — TRANSCRIBE ORDERS (OUTPATIENT)
Dept: ADMINISTRATIVE | Facility: HOSPITAL | Age: 71
End: 2021-03-30

## 2021-03-30 DIAGNOSIS — Z12.31 VISIT FOR SCREENING MAMMOGRAM: Primary | ICD-10-CM

## 2021-04-01 ENCOUNTER — LAB (OUTPATIENT)
Dept: LAB | Facility: HOSPITAL | Age: 71
End: 2021-04-01

## 2021-04-01 ENCOUNTER — CLINICAL SUPPORT (OUTPATIENT)
Dept: GENETICS | Facility: HOSPITAL | Age: 71
End: 2021-04-01

## 2021-04-01 DIAGNOSIS — Z80.42 FAMILY HISTORY OF MALIGNANT NEOPLASM OF PROSTATE: ICD-10-CM

## 2021-04-01 DIAGNOSIS — Z80.3 FAMILY HISTORY OF BREAST CANCER: ICD-10-CM

## 2021-04-01 DIAGNOSIS — Z80.0 FAMILY HISTORY OF COLON CANCER: ICD-10-CM

## 2021-04-01 DIAGNOSIS — Z13.79 GENETIC TESTING: Primary | ICD-10-CM

## 2021-04-01 PROCEDURE — 96040: CPT | Performed by: GENETIC COUNSELOR, MS

## 2021-04-02 NOTE — PROGRESS NOTES
Beverley Yoo, a 70-year-old female, was seen for genetic counseling due to a family history of cancer. She was 16 years old at menarche and had her first child at 18.  She had a hysterectomy with BSO at age 43. She reports HRT use for 26 years. Her current cancer screening includes annual clinical breast exam, annual mammogram, and colonoscopy every ten years. She was interested in discussing her risk for a hereditary cancer syndrome. Ms. Yoo was interested in pursuing genetic testing, and therefore, the [facility] Common Hereditary Cancers panel was ordered which analyzes 47 genes associated with an increased cancer risk. Results from this testing are expected in approximately 2-3 weeks.    FAMILY HISTORY (see attached pedigree):    Mother:  Breast cancer, 72 (metastatic)  Father:   Prostate cancer  Sister:   Breast cancer, 55  Mat. Grandmother: Breast cancer, 40  Mat. Aunt:  Colorectal cancer  Mat. 1st cousin: Leukemia    We do not have medical records confirming the diagnoses in Ms. Yoo’s family.    RISK ASSESSMENT: Ms. Yoo’s family history led to concern regarding a hereditary cancer syndrome. She clearly meets NCCN guidelines criteria for BRCA1/2 testing based on her family history of breast cancer in at least three close relatives. In cases where an affected relative is not available for testing or not willing to pursue testing, it is appropriate to offer testing to an unaffected individual. Ms. Yoo opted to pursue multigene panel testing.   If genetic testing is negative, Ms. Yoo’s management should be guided by family history. These risk assessments are based on the family history information provided at the time of the appointment and could change in the future should new information be obtained.    GENETIC COUNSELING: (30 minutes) We reviewed the family history information in detail.  Cases of cancer follow three general patterns: sporadic, familial, and hereditary.  While most cancer is  sporadic, some cases appear to occur in family clusters.  These cases are said to be familial and account for 10-20% of cancer cases.  Familial cases may be due to a combination of shared genes and environmental factors among family members.  In even fewer families, the cancer is said to be inherited, and the genes responsible for the cancer are known.      The pedigree patterns observed in sporadic versus hereditary cancer families were reviewed.  Family histories typical of hereditary cancer syndromes usually include multiple first- and second-degree relatives diagnosed with cancer types that define a syndrome.  These cases tend to be diagnosed at younger-than-expected ages and can be bilateral or multifocal.  The cancer in these families follows an autosomal dominant inheritance pattern, which indicates the likely presence of a mutation in a cancer susceptibility gene.  Children and siblings of an individual believed to carry this mutation have a 50% chance of inheriting that mutation, thereby inheriting the increased risk to develop cancer.    Based on Ms. Yoo’s family history, we discussed that hereditary breast cancer accounts for 5-10% of all cases of breast cancer.  A significant proportion of hereditary breast cancer can be attributed to mutations in the BRCA1 and BRCA2 genes.  Mutations in these genes confer an increased risk for breast cancer, ovarian cancer, male breast cancer, prostate cancer, melanoma, and pancreatic cancer.  Women with a BRCA1 or BRCA2 mutation have up to an 87% lifetime risk of breast cancer and up to a 44% risk of ovarian cancer.    There are other genes that are known to be associated with an increased risk for cancer.  Some of these genes have well defined cancer risks and established management guidelines.  Other genes that can be tested for have been more recently described, and there may be less data regarding the risks and therefore may not have established management  guidelines. We discussed these limitations at length.  Based on Ms. Yoo’s desire to get as much information as possible regarding her personal risks and potential risks for her family, she opted to pursue testing through a panel that would look at several other genes known to increase the risk for cancer.    GENETIC TESTING:  The risks, benefits and limitations of genetic testing and implications for clinical management following testing were reviewed. DNA test results can influence decisions regarding screening and prevention.  Genetic testing can have significant psychological implications for both individuals and families. Also discussed was the possibility of employment and insurance discrimination based on genetic test results and the federal and states laws that are in place to prevent this (FARZANEH), as well as the limitations of these laws.         We discussed panel testing, which would involve testing several genes associated with increased cancer risk. The benefits and limitations of genetic testing were discussed. The implications of a positive or negative test result were discussed. We also discussed the importance of testing on an affected relative. We discussed the possibility that, in some cases, genetic test results may be ambiguous due to the identification of a genetic variant. These variants may or may not be associated with an increased cancer risk. With multigene panel testing, it is not uncommon for a variant of uncertain significance (VUS) to be identified.  If a VUS is identified, testing family members is not recommended and screening recommendations are made based on the family history. The laboratories that perform genetic testing work to reclassify the VUS and send out an amended report if and when a VUS is reclassified.  The majority of variant findings are ultimately reclassified to a negative result. Given her family history, a negative test result does not eliminate all cancer risk,  although the risk would not be as high as it would with positive genetic testing.     PLAN:  Genetic testing via the [facility] Common Hereditary Cancers panel was ordered. Results are expected in 2-3 weeks and we will contact Ms. Yoo with her results once they are received.      Cristy Granados MS, [facility] [place] [facility]  Licensed Certified Genetic Counselor

## 2021-04-06 ENCOUNTER — HOSPITAL ENCOUNTER (OUTPATIENT)
Dept: MAMMOGRAPHY | Facility: HOSPITAL | Age: 71
Discharge: HOME | End: 2021-04-06

## 2021-04-06 DIAGNOSIS — Z12.31 VISIT FOR SCREENING MAMMOGRAM: ICD-10-CM

## 2021-04-20 ENCOUNTER — DOCUMENTATION (OUTPATIENT)
Dept: GENETICS | Facility: HOSPITAL | Age: 71
End: 2021-04-20

## 2021-04-20 NOTE — PROGRESS NOTES
SUMMARY: Genetic testing was negative for pathogenic mutations in BRCA1/2 and 45 additional genes. Lifetime breast cancer risk estimated to be 8.7% using family history-based risk model (Ayana, v8).     Beverley Yoo, a 70-year-old female, was seen for genetic counseling due to a family history of cancer. She was 16 years old at menarche and had her first child at 18.  She had a hysterectomy with BSO at age 43. She reports HRT use for 26 years. Her current cancer screening includes annual clinical breast exam, annual mammogram, and colonoscopy every ten years. She does not have a personal history of colon polyps. She was interested in discussing her risk for a hereditary cancer syndrome. Ms. Yoo was interested in pursuing genetic testing, and therefore, the [facility] Common Hereditary Cancers panel was ordered which analyzes 47 genes associated with an increased cancer risk. Genetic testing was negative for pathogenic mutations in BRCA1/2 and 45 additional genes included on the Common Hereditary Cancers panel. These normal results were discussed with Ms. Yoo by telephone on 4/20/2021.    FAMILY HISTORY (see attached pedigree):    Mother:  Breast cancer, 72 (metastatic)  Father:   Prostate cancer, 60  Sister:   Breast cancer, 55  Mat. Grandmother: Breast cancer, 40  Mat. Aunt:  Colorectal cancer  Mat. 1st cousin: Leukemia    We do not have medical records confirming the diagnoses in Ms. Yoo’s family.    RISK ASSESSMENT: Ms. Yoo’s family history led to concern regarding a hereditary cancer syndrome. She clearly meets NCCN guidelines criteria for BRCA1/2 testing based on her family history of breast cancer in at least three close relatives. In cases where an affected relative is not available for testing or not willing to pursue testing, it is appropriate to offer testing to an unaffected individual. Ms. Yoo opted to pursue multigene panel testing.   If genetic testing is negative, Ms. Yoo’s  management should be guided by family history.     Because genetic testing was negative, Ms. Yoo’s management should be guided by a family history-based risk assessment.  Tyrer-Cuzick, version 8 is able to take into account personal factors (age at menarche, age at first live birth, breast density, HRT use, etc.) and family history when calculating risk for breast cancer. Computer modeling estimates that Ms. Yoo’s lifetime personal risk for developing breast cancer is up to 8.7% (Tyrer-Cuzick, v8), in comparison to the average 69-year-old female’s risk of 4.6%. Per NCCN guidelines, a lifetime risk above 20% is considered to be “high risk” where increased screening is warranted. NCCN guidelines state that risk should be assessed using models that are largely dependent on family history (eg, Tyrer-Cuzick).  Risk assessment had previously been completed for Ms. Yoo using the Kati risk model and it was reported to be 32%. The Kati risk model does not include in its assessment several factors, including number of unaffected female relatives in a family, age of breast cancer diagnoses in affected relatives, age at menopause, HRT use, breast density, and negative genetic testing. Ms. Yoo went through surgical menopause at age 43, reducing her risk of breast cancer. This risk assessment is based on the family history information provided at the time of the appointment and could change in the future should new information be obtained.    GENETIC COUNSELING: We reviewed the family history information in detail.  Cases of cancer follow three general patterns: sporadic, familial, and hereditary.  While most cancer is sporadic, some cases appear to occur in family clusters.  These cases are said to be familial and account for 10-20% of cancer cases.  Familial cases may be due to a combination of shared genes and environmental factors among family members.  In even fewer families, the cancer is said to be inherited,  and the genes responsible for the cancer are known.      The pedigree patterns observed in sporadic versus hereditary cancer families were reviewed.  Family histories typical of hereditary cancer syndromes usually include multiple first- and second-degree relatives diagnosed with cancer types that define a syndrome.  These cases tend to be diagnosed at younger-than-expected ages and can be bilateral or multifocal.  The cancer in these families follows an autosomal dominant inheritance pattern, which indicates the likely presence of a mutation in a cancer susceptibility gene.  Children and siblings of an individual believed to carry this mutation have a 50% chance of inheriting that mutation, thereby inheriting the increased risk to develop cancer.    Based on Ms. Yoo’s family history, we discussed that hereditary breast cancer accounts for 5-10% of all cases of breast cancer.  A significant proportion of hereditary breast cancer can be attributed to mutations in the BRCA1 and BRCA2 genes.  Mutations in these genes confer an increased risk for breast cancer, ovarian cancer, male breast cancer, prostate cancer, melanoma, and pancreatic cancer.  Women with a BRCA1 or BRCA2 mutation have up to an 87% lifetime risk of breast cancer and up to a 44% risk of ovarian cancer.    There are other genes that are known to be associated with an increased risk for cancer.  Some of these genes have well defined cancer risks and established management guidelines.  Other genes that can be tested for have been more recently described, and there may be less data regarding the risks and therefore may not have established management guidelines. We discussed these limitations at length.  Based on Ms. Yoo’s desire to get as much information as possible regarding her personal risks and potential risks for her family, she opted to pursue testing through a panel that would look at several other genes known to increase the risk for  cancer.    GENETIC TESTING:  The risks, benefits and limitations of genetic testing and implications for clinical management following testing were reviewed. DNA test results can influence decisions regarding screening and prevention.  Genetic testing can have significant psychological implications for both individuals and families. Also discussed was the possibility of employment and insurance discrimination based on genetic test results and the federal and states laws that are in place to prevent this (FARZANEH), as well as the limitations of these laws.         We discussed panel testing, which would involve testing several genes associated with increased cancer risk. The benefits and limitations of genetic testing were discussed. The implications of a positive or negative test result were discussed. We also discussed the importance of testing on an affected relative. We discussed the possibility that, in some cases, genetic test results may be ambiguous due to the identification of a genetic variant. These variants may or may not be associated with an increased cancer risk. With multigene panel testing, it is not uncommon for a variant of uncertain significance (VUS) to be identified.  If a VUS is identified, testing family members is not recommended and screening recommendations are made based on the family history. The laboratories that perform genetic testing work to reclassify the VUS and send out an amended report if and when a VUS is reclassified.  The majority of variant findings are ultimately reclassified to a negative result. Given her family history, a negative test result does not eliminate all cancer risk, although the risk would not be as high as it would with positive genetic testing.     TEST RESULTS: Genetic testing was negative for known pathogenic mutations by sequencing and rearrangement testing for the genes on the Common Hereditary Cancers panel (see attached results).  This negative result  greatly lowers, but does not eliminate, the risk of a hereditary cancer syndrome for Ms. Yoo.  It is possible that the family history is due to a hereditary cancer syndrome that Ms. Yoo did not happen to inherit. If a relative of Ms. Yoo were to have testing and was found to carry a mutation in a gene included on this panel in the future, then her risk assessment would need to be updated. This assessment is based on the information provided at the time of the consultation.    CANCER PREVENTION:  Options available to individuals with an elevated lifetime risk for breast and/or ovarian cancer were briefly discussed, including increased surveillance, chemoprevention and prophylactic surgery (mastectomy and/or oophorectomy).  Based on the Tyrer-Cuzick risk assessment model, Ms. Yoo’s lifetime risk for breast cancer would not be considered “high risk”. Per NCCN guidelines, it is appropriate for her to continue to follow general population screening guidelines for her breast cancer risk, including self-breast awareness, annual clinical breast exam, and annual mammography. This assessment is based on the information provided at the time of appointment and could change should new information become available.    PLAN:  Genetic counseling remains available to Ms. Yoo and her family. If she has any questions, concerns, or updates to the family history she is welcome to contact our office at [phone].      Cristy Granados MS, [facility] [place], [facility]  Licensed Certified Genetic Counselor      Cc: MD Nela Gandhi MD

## 2021-04-27 ENCOUNTER — TELEPHONE (OUTPATIENT)
Dept: ENDOCRINOLOGY | Facility: CLINIC | Age: 71
End: 2021-04-27

## 2021-04-27 DIAGNOSIS — R92.8 MAMMOGRAM ABNORMAL: Primary | ICD-10-CM

## 2021-04-27 NOTE — TELEPHONE ENCOUNTER
Patient sent message stating mamm could not be done due to being ordered as screening and she has very small left breast lump left lower outer quadrant with breast cancer in her mother and sister   I did not receive any message from mamm or central scheduling about a need to change her order  Had mamm 4/23/20 with u/s showing dense breasts and it was noted that she may be a candidate for high risk screening MRI but no recommendation for this procedure was made   She has been seen by genetic counseling and panel was ordered- is pending   Has had prior mamm via [facility]   irasema Yeung

## 2021-05-17 RX ORDER — LEVOTHYROXINE SODIUM 100 MCG
TABLET ORAL
Qty: 90 TABLET | Refills: 1 | Status: SHIPPED | OUTPATIENT
Start: 2021-05-17 | End: 2021-11-15

## 2021-05-25 ENCOUNTER — HOSPITAL ENCOUNTER (OUTPATIENT)
Dept: ULTRASOUND IMAGING | Facility: HOSPITAL | Age: 71
Discharge: HOME | End: 2021-05-25

## 2021-05-25 ENCOUNTER — TRANSCRIBE ORDERS (OUTPATIENT)
Dept: MAMMOGRAPHY | Facility: HOSPITAL | Age: 71
End: 2021-05-25

## 2021-05-25 ENCOUNTER — HOSPITAL ENCOUNTER (OUTPATIENT)
Dept: MAMMOGRAPHY | Facility: HOSPITAL | Age: 71
Discharge: HOME | End: 2021-05-25

## 2021-05-25 DIAGNOSIS — R92.8 ABNORMAL MAMMOGRAM: Primary | ICD-10-CM

## 2021-05-25 DIAGNOSIS — R92.8 MAMMOGRAM ABNORMAL: ICD-10-CM

## 2021-05-25 PROCEDURE — G0279 TOMOSYNTHESIS, MAMMO: HCPCS

## 2021-05-25 PROCEDURE — 76642 ULTRASOUND BREAST LIMITED: CPT

## 2021-05-25 PROCEDURE — 77066 DX MAMMO INCL CAD BI: CPT

## 2021-05-25 PROCEDURE — G0279 TOMOSYNTHESIS, MAMMO: HCPCS | Performed by: RADIOLOGY

## 2021-05-25 PROCEDURE — 77066 DX MAMMO INCL CAD BI: CPT | Performed by: RADIOLOGY

## 2021-05-25 PROCEDURE — 76642 ULTRASOUND BREAST LIMITED: CPT | Performed by: RADIOLOGY

## 2021-06-01 ENCOUNTER — LAB (OUTPATIENT)
Dept: LAB | Facility: HOSPITAL | Age: 71
End: 2021-06-01

## 2021-06-01 ENCOUNTER — OFFICE VISIT (OUTPATIENT)
Dept: ENDOCRINOLOGY | Facility: CLINIC | Age: 71
End: 2021-06-01

## 2021-06-01 VITALS
SYSTOLIC BLOOD PRESSURE: 142 MMHG | WEIGHT: 128.8 LBS | OXYGEN SATURATION: 98 % | DIASTOLIC BLOOD PRESSURE: 80 MMHG | HEART RATE: 72 BPM | BODY MASS INDEX: 21.99 KG/M2 | HEIGHT: 64 IN

## 2021-06-01 DIAGNOSIS — E03.9 ACQUIRED HYPOTHYROIDISM: ICD-10-CM

## 2021-06-01 DIAGNOSIS — E78.00 PURE HYPERCHOLESTEROLEMIA: ICD-10-CM

## 2021-06-01 DIAGNOSIS — E55.9 VITAMIN D DEFICIENCY: ICD-10-CM

## 2021-06-01 DIAGNOSIS — N63.0 BREAST LUMP: ICD-10-CM

## 2021-06-01 DIAGNOSIS — N60.19 FIBROCYSTIC BREAST DISEASE (FCBD), UNSPECIFIED LATERALITY: Primary | ICD-10-CM

## 2021-06-01 DIAGNOSIS — R92.2 INCONCLUSIVE MAMMOGRAM: ICD-10-CM

## 2021-06-01 PROBLEM — H52.229 REGULAR ASTIGMATISM: Status: ACTIVE | Noted: 2021-04-12

## 2021-06-01 PROBLEM — H52.00 HYPERMETROPIA: Status: ACTIVE | Noted: 2021-04-12

## 2021-06-01 PROBLEM — H43.819 POSTERIOR VITREOUS DETACHMENT: Status: ACTIVE | Noted: 2021-04-12

## 2021-06-01 PROBLEM — Z83.511 FAMILY HISTORY OF GLAUCOMA: Status: ACTIVE | Noted: 2021-04-12

## 2021-06-01 PROBLEM — H52.4 PRESBYOPIA: Status: ACTIVE | Noted: 2021-04-12

## 2021-06-01 LAB
25(OH)D3 SERPL-MCNC: 43.4 NG/ML
ALBUMIN SERPL-MCNC: 4.6 G/DL (ref 3.5–5.2)
ALBUMIN/GLOB SERPL: 1.8 G/DL
ALP SERPL-CCNC: 100 U/L (ref 39–117)
ALT SERPL W P-5'-P-CCNC: 17 U/L (ref 1–33)
ANION GAP SERPL CALCULATED.3IONS-SCNC: 8.8 MMOL/L (ref 5–15)
AST SERPL-CCNC: 24 U/L (ref 1–32)
BILIRUB SERPL-MCNC: 0.4 MG/DL (ref 0–1.2)
BUN SERPL-MCNC: 13 MG/DL (ref 8–23)
BUN/CREAT SERPL: 16 (ref 7–25)
CALCIUM SPEC-SCNC: 10 MG/DL (ref 8.6–10.5)
CHLORIDE SERPL-SCNC: 100 MMOL/L (ref 98–107)
CHOLEST SERPL-MCNC: 291 MG/DL (ref 0–200)
CO2 SERPL-SCNC: 28.2 MMOL/L (ref 22–29)
CREAT SERPL-MCNC: 0.81 MG/DL (ref 0.57–1)
GFR SERPL CREATININE-BSD FRML MDRD: 70 ML/MIN/1.73
GLOBULIN UR ELPH-MCNC: 2.5 GM/DL
GLUCOSE SERPL-MCNC: 84 MG/DL (ref 65–99)
HDLC SERPL-MCNC: 83 MG/DL (ref 40–60)
LDLC SERPL CALC-MCNC: 181 MG/DL (ref 0–100)
LDLC/HDLC SERPL: 2.14 {RATIO}
POTASSIUM SERPL-SCNC: 4 MMOL/L (ref 3.5–5.2)
PROT SERPL-MCNC: 7.1 G/DL (ref 6–8.5)
SODIUM SERPL-SCNC: 137 MMOL/L (ref 136–145)
T4 FREE SERPL-MCNC: 1.32 NG/DL (ref 0.93–1.7)
TRIGL SERPL-MCNC: 150 MG/DL (ref 0–150)
TSH SERPL DL<=0.05 MIU/L-ACNC: 0.91 UIU/ML (ref 0.27–4.2)
VLDLC SERPL-MCNC: 27 MG/DL (ref 5–40)

## 2021-06-01 PROCEDURE — 84443 ASSAY THYROID STIM HORMONE: CPT | Performed by: INTERNAL MEDICINE

## 2021-06-01 PROCEDURE — 84439 ASSAY OF FREE THYROXINE: CPT | Performed by: INTERNAL MEDICINE

## 2021-06-01 PROCEDURE — 82306 VITAMIN D 25 HYDROXY: CPT | Performed by: INTERNAL MEDICINE

## 2021-06-01 PROCEDURE — 80053 COMPREHEN METABOLIC PANEL: CPT | Performed by: INTERNAL MEDICINE

## 2021-06-01 PROCEDURE — 80061 LIPID PANEL: CPT | Performed by: INTERNAL MEDICINE

## 2021-06-01 PROCEDURE — 99214 OFFICE O/P EST MOD 30 MIN: CPT | Performed by: INTERNAL MEDICINE

## 2021-06-01 NOTE — PROGRESS NOTES
Beverley Yoo 70 y.o.  CC:Follow-up, Hypothyroidism, Hyperlipidemia, and Vitamin D Deficiency      [place]: Follow-up, Hypothyroidism, Hyperlipidemia, and Vitamin D Deficiency    Is on synthroid 100 mcg dailiang   Is on low fat diet and lipitor 10 mg daily   bp higher- recheck   Had abnormal mamm- also palpable left breast lesion   MRI recommended by surgeon   Also recent abnormal mamm for which MRI was recommended  Also new findings on right breast (microcalcification)    No Known Allergies    Current Outpatient Medications:   •  atorvastatin (Lipitor) 10 MG tablet, Take 1 tablet by mouth Daily., Disp: 30 tablet, Rfl: 11  •  Calcium-Magnesium-Vitamin D (CALCIUM 500 PO), Take  by mouth 2 (Two) Times a Day., Disp: , Rfl:   •  estradiol (VAGIFEM) 10 MCG tablet vaginal tablet, INSERT 1 TABLET VAGINALLY THREE TIMES A WEEK, Disp: 48 tablet, Rfl: 3  •  Fish Oil-Cholecalciferol (OMEGA-3 + D PO), Take  by mouth 2 (Two) Times a Day., Disp: , Rfl:   •  FLUZONE HIGH-DOSE 0.5 ML suspension prefilled syringe injection, , Disp: , Rfl: 0  •  Multiple Vitamins-Minerals (MULTIVITAMIN ADULTS PO), Take  by mouth., Disp: , Rfl:   •  Synthroid 100 MCG tablet, TAKE 1 TABLET DAILY, Disp: 90 tablet, Rfl: 1  •  tretinoin (RETIN-A) 0.05 % cream, [facility] HS, Disp: , Rfl: 11  Patient Active Problem List    Diagnosis    • Left breast lump [N63.20]    • Asymptomatic postmenopausal state [Z78.0]    • Urinary, incontinence, stress female [N39.3]    • Pyriformis syndrome, unspecified laterality [G57.00]    • PAC (premature atrial contraction) [I49.1]    • Acquired hallux rigidus of left foot [M20.22]    • 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   HENT: Negative for congestion and rhinorrhea.    Eyes: Negative for visual disturbance.   Respiratory: Negative for cough and shortness of breath.    Cardiovascular: Negative for palpitations and leg swelling.   Gastrointestinal: Negative for constipation, diarrhea and nausea.   Genitourinary: Negative for hematuria.   Musculoskeletal: Negative for arthralgias, back pain, gait problem, joint swelling and myalgias.   Skin: Negative for color change, rash and wound.   Allergic/Immunologic: Negative for environmental allergies, food allergies and immunocompromised state.   Neurological: Negative for dizziness, weakness and light-headedness.   Psychiatric/Behavioral: Negative for confusion, decreased concentration, dysphoric mood and sleep disturbance. The patient is not nervous/anxious.      Social History     Socioeconomic History   • Marital status:      Spouse name: Not on file   • Number of children: Not on file   • Years of education: Not on file   • Highest education level: Not on file   Tobacco Use   • Smoking status: Never Smoker   • Smokeless tobacco: Never Used   Substance and Sexual Activity   • Alcohol use: Yes     Types: 4 Glasses of wine per week     Comment: 1 glass of wine 5 days per week   • Drug use: No   • Sexual activity: Yes     Partners: Male     Birth control/protection: Post-menopausal, Surgical     Family History   Problem Relation Age of Onset   • Cancer Paternal Grandfather         BREAST    • Breast cancer Mother 60   • Cancer Mother         Breast CA   • Thyroid disease Mother    • Breast cancer Sister 60   • Breast cancer Maternal Grandmother 60   • Cancer Maternal Grandmother         Breast CA   • Cancer Father    • Anemia Father    • Hyperlipidemia Father    • Cancer Sister       "   Breast CA   • Ovarian cancer Neg Hx    • Endometrial cancer Neg Hx      /80   Pulse 72   Ht 162.6 cm (64\")   Wt 58.4 kg (128 lb 12.8 oz)   SpO2 98%   BMI 22.11 kg/m²   Physical Exam  Vitals and nursing note reviewed.   Constitutional:       Appearance: Normal appearance. She is well-developed.   HENT:      Head: Normocephalic and atraumatic.   Eyes:      General: Lids are normal.      Extraocular Movements: Extraocular movements intact.      Conjunctiva/sclera: Conjunctivae normal.      Pupils: Pupils are equal, round, and reactive to light.   Neck:      Thyroid: No thyroid mass or thyromegaly.      Vascular: No carotid bruit.      Trachea: Trachea normal. No tracheal deviation.   Cardiovascular:      Rate and Rhythm: Normal rate and regular rhythm.      Pulses: Normal pulses.      Heart sounds: Normal heart sounds. No murmur heard.   No friction rub. No gallop.    Pulmonary:      Effort: Pulmonary effort is normal. No respiratory distress.      Breath sounds: Normal breath sounds. No wheezing.   Musculoskeletal:         General: No deformity. Normal range of motion.      Cervical back: Normal range of motion and neck supple.   Lymphadenopathy:      Cervical: No cervical adenopathy.   Skin:     General: Skin is warm and dry.      Findings: No erythema or rash.      Nails: There is no clubbing.   Neurological:      Mental Status: She is alert and oriented to person, place, and time.      Cranial Nerves: No cranial nerve deficit.      Deep Tendon Reflexes: Reflexes are normal and symmetric. Reflexes normal.   Psychiatric:         Mood and Affect: Mood normal.         Speech: Speech normal.         Behavior: Behavior normal.         Thought Content: Thought content normal.         Judgment: Judgment normal.       Results for orders placed or performed in visit on 11/11/20   Comprehensive Metabolic Panel    Specimen: Blood   Result Value Ref Range    Glucose 97 65 - 99 mg/dL    BUN 16 8 - 23 mg/dL    " Creatinine 0.83 0.57 - 1.00 mg/dL    Sodium 140 136 - 145 mmol/L    Potassium 4.4 3.5 - 5.2 mmol/L    Chloride 104 98 - 107 mmol/L    CO2 27.2 22.0 - 29.0 mmol/L    Calcium 10.6 (H) 8.6 - 10.5 mg/dL    Total Protein 7.2 6.0 - 8.5 g/dL    Albumin 4.70 3.50 - 5.20 g/dL    ALT (SGPT) 17 1 - 33 U/L    AST (SGOT) 21 1 - 32 U/L    Alkaline Phosphatase 79 39 - 117 U/L    Total Bilirubin 0.3 0.0 - 1.2 mg/dL    eGFR Non African Amer 68 >60 mL/min/1.73    Globulin 2.5 gm/dL    A/G Ratio 1.9 g/dL    BUN/Creatinine Ratio 19.3 7.0 - 25.0    Anion Gap 8.8 5.0 - 15.0 mmol/L   CBC Auto Differential    Specimen: Blood   Result Value Ref Range    WBC 4.66 3.40 - 10.80 10*3/mm3    RBC 4.47 3.77 - 5.28 10*6/mm3    Hemoglobin 13.9 12.0 - 15.9 g/dL    Hematocrit 40.6 34.0 - 46.6 %    MCV 90.8 79.0 - 97.0 fL    MCH 31.1 26.6 - 33.0 pg    MCHC 34.2 31.5 - 35.7 g/dL    RDW 11.6 (L) 12.3 - 15.4 %    RDW-SD 38.1 37.0 - 54.0 fl    MPV 10.9 6.0 - 12.0 fL    Platelets 257 140 - 450 10*3/mm3    Neutrophil % 47.3 42.7 - 76.0 %    Lymphocyte % 34.8 19.6 - 45.3 %    Monocyte % 14.4 (H) 5.0 - 12.0 %    Eosinophil % 2.4 0.3 - 6.2 %    Basophil % 0.9 0.0 - 1.5 %    Immature Grans % 0.2 0.0 - 0.5 %    Neutrophils, Absolute 2.21 1.70 - 7.00 10*3/mm3    Lymphocytes, Absolute 1.62 0.70 - 3.10 10*3/mm3    Monocytes, Absolute 0.67 0.10 - 0.90 10*3/mm3    Eosinophils, Absolute 0.11 0.00 - 0.40 10*3/mm3    Basophils, Absolute 0.04 0.00 - 0.20 10*3/mm3    Immature Grans, Absolute 0.01 0.00 - 0.05 10*3/mm3    nRBC 0.0 0.0 - 0.2 /100 WBC   Lipid Panel    Specimen: Blood   Result Value Ref Range    Total Cholesterol 281 (H) 0 - 200 mg/dL    Triglycerides 159 (H) 0 - 150 mg/dL    HDL Cholesterol 82 (H) 40 - 60 mg/dL    LDL Cholesterol  171 (H) 0 - 100 mg/dL    VLDL Cholesterol 28 5 - 40 mg/dL    LDL/HDL Ratio 2.04    TSH    Specimen: Blood   Result Value Ref Range    TSH 0.457 0.270 - 4.200 uIU/mL   T4, Free    Specimen: Blood   Result Value Ref Range    Free T4  1.49 0.93 - 1.70 ng/dL   Calcium, Ionized    Specimen: Blood   Result Value Ref Range    Ionized Calcium 1.47 (H) 1.15 - 1.35 mmol/L    Ionized Calcium 5.9 (H) 4.6 - 5.4 mg/dL   PTH, Intact    Specimen: Blood   Result Value Ref Range    PTH, Intact 44.1 15.0 - 65.0 pg/mL   Vitamin D 25 Hydroxy    Specimen: Blood   Result Value Ref Range    25 Hydroxy, Vitamin D 52.3 30.0 - 100.0 ng/ml     Problems Addressed this Visit        Other    Fibrocystic breast disease - Primary     Left periaureolar lump- slightly larger   Followed by Dr MÁRQUEZ  Also microcalcifications right, new for which biopsy scheduled  She has had multiple biopsies in past   Current mode of assessment limited due to dense breasts  MRI never scheduled- will set this up          Relevant Orders    MRI breast bilateral diagnostic w wo contrast    Hyperlipidemia     Is on low fat diet and lipitor 10 mg daily   Check flp          Relevant Orders    Comprehensive Metabolic Panel    Lipid Panel    Hypothyroidism     Update tfts   On synthroid 100 mcg daily          Relevant Orders    TSH    T4, Free    Vitamin D deficiency     Continue supplement  Update levels          Relevant Orders    Vitamin D 25 Hydroxy      Other Visit Diagnoses     Breast lump        Relevant Orders    MRI breast bilateral diagnostic w wo contrast    Inconclusive mammogram         Relevant Orders    MRI breast bilateral diagnostic w wo contrast      Diagnoses       Codes Comments    Fibrocystic breast disease (FCBD), unspecified laterality    -  Primary ICD-10-CM: N60.19  ICD-9-CM: 610.1     Breast lump     ICD-10-CM: N63.0  ICD-9-CM: 611.72     Inconclusive mammogram      ICD-10-CM: R92.2  ICD-9-CM: 793.82     Vitamin D deficiency     ICD-10-CM: E55.9  ICD-9-CM: 268.9     Acquired hypothyroidism     ICD-10-CM: E03.9  ICD-9-CM: 244.9     Pure hypercholesterolemia     ICD-10-CM: E78.00  ICD-9-CM: 272.0         Recheck bp high systolic   Recommended continued home monitoring with call if  over 145/85   Return in about 6 months (around 12/1/2021) for Recheck.    Sheryl Rivas MA  Signed Nela Kenyon MD

## 2021-06-01 NOTE — ASSESSMENT & PLAN NOTE
Left periaureolar lump- slightly larger   Followed by Dr MÁRQUEZ  Also microcalcifications right, new for which biopsy scheduled  She has had multiple biopsies in past   Current mode of assessment limited due to dense breasts  MRI never scheduled- will set this up

## 2021-06-15 ENCOUNTER — HOSPITAL ENCOUNTER (OUTPATIENT)
Dept: MRI IMAGING | Facility: HOSPITAL | Age: 71
Discharge: HOME | End: 2021-06-15
Admitting: INTERNAL MEDICINE

## 2021-06-15 DIAGNOSIS — N60.19 FIBROCYSTIC BREAST DISEASE (FCBD), UNSPECIFIED LATERALITY: ICD-10-CM

## 2021-06-15 DIAGNOSIS — N63.0 BREAST LUMP: ICD-10-CM

## 2021-06-15 DIAGNOSIS — R92.2 INCONCLUSIVE MAMMOGRAM: ICD-10-CM

## 2021-06-15 PROCEDURE — 77049 MRI BREAST C-+ W/CAD BI: CPT | Performed by: RADIOLOGY

## 2021-06-15 PROCEDURE — A9577 INJ MULTIHANCE: HCPCS | Performed by: INTERNAL MEDICINE

## 2021-06-15 PROCEDURE — 0 GADOBENATE DIMEGLUMINE 529 MG/ML SOLUTION: Performed by: INTERNAL MEDICINE

## 2021-06-15 PROCEDURE — C8937 CAD BREAST MRI: HCPCS

## 2021-06-15 PROCEDURE — C8908 MRI W/O FOL W/CONT, BREAST,: HCPCS

## 2021-06-15 RX ADMIN — GADOBENATE DIMEGLUMINE 10 ML: 529 INJECTION, SOLUTION INTRAVENOUS at 12:36

## 2021-06-16 ENCOUNTER — TELEPHONE (OUTPATIENT)
Dept: MRI IMAGING | Facility: HOSPITAL | Age: 71
End: 2021-06-16

## 2021-06-18 ENCOUNTER — HOSPITAL ENCOUNTER (OUTPATIENT)
Dept: MAMMOGRAPHY | Facility: HOSPITAL | Age: 71
Discharge: HOME | End: 2021-06-18

## 2021-06-18 DIAGNOSIS — R92.8 ABNORMAL MAMMOGRAM: ICD-10-CM

## 2021-06-18 PROCEDURE — A4648 IMPLANTABLE TISSUE MARKER: HCPCS

## 2021-06-18 PROCEDURE — 25010000003 LIDOCAINE 1 % SOLUTION: Performed by: RADIOLOGY

## 2021-06-18 PROCEDURE — 76098 X-RAY EXAM SURGICAL SPECIMEN: CPT

## 2021-06-18 PROCEDURE — 88305 TISSUE EXAM BY PATHOLOGIST: CPT | Performed by: INTERNAL MEDICINE

## 2021-06-18 PROCEDURE — 19081 BX BREAST 1ST LESION STRTCTC: CPT | Performed by: RADIOLOGY

## 2021-06-18 PROCEDURE — 77065 DX MAMMO INCL CAD UNI: CPT | Performed by: RADIOLOGY

## 2021-06-18 RX ORDER — LIDOCAINE HYDROCHLORIDE AND EPINEPHRINE 10; 10 MG/ML; UG/ML
10 INJECTION, SOLUTION INFILTRATION; PERINEURAL ONCE
Status: COMPLETED | OUTPATIENT
Start: 2021-06-18 | End: 2021-06-18

## 2021-06-18 RX ORDER — LIDOCAINE HYDROCHLORIDE 10 MG/ML
5 INJECTION, SOLUTION INFILTRATION; PERINEURAL ONCE
Status: COMPLETED | OUTPATIENT
Start: 2021-06-18 | End: 2021-06-18

## 2021-06-18 RX ADMIN — LIDOCAINE HYDROCHLORIDE,EPINEPHRINE BITARTRATE 3 ML: 10; .01 INJECTION, SOLUTION INFILTRATION; PERINEURAL at 11:23

## 2021-06-18 RX ADMIN — LIDOCAINE HYDROCHLORIDE 1 ML: 10 INJECTION, SOLUTION INFILTRATION; PERINEURAL at 11:23

## 2021-06-18 NOTE — PROGRESS NOTES
Alert and orientated. Denies discomfort., Steri-strips not visualized, gauze dressing intact. Ice packs given. Verbalizes and demonstrates understanding of post-care instructions, written copy given.

## 2021-06-21 LAB
CYTO UR: NORMAL
LAB AP CASE REPORT: NORMAL
LAB AP CLINICAL INFORMATION: NORMAL
LAB AP DIAGNOSIS COMMENT: NORMAL
PATH REPORT.FINAL DX SPEC: NORMAL
PATH REPORT.GROSS SPEC: NORMAL

## 2021-06-22 ENCOUNTER — TELEPHONE (OUTPATIENT)
Dept: MAMMOGRAPHY | Facility: HOSPITAL | Age: 71
End: 2021-06-22

## 2021-06-22 NOTE — TELEPHONE ENCOUNTER
Pt notified of pathology results and recommendations. Verbalizes understanding. Denies discomfort. Denies signs and symptoms of infection.     Pt notified of surgical consult appointment with Dr. Tejada on 6.28.21 @ [phone] . Pt given office contact & location information. Told to bring photo ID, list of prescription & OTC medications, insurance information, must wear a mask. Encouraged pt to call back or contact surgeon's office with further questions. Pt verbalized understanding.

## 2021-11-15 RX ORDER — LEVOTHYROXINE SODIUM 100 MCG
TABLET ORAL
Qty: 90 TABLET | Refills: 0 | Status: SHIPPED | OUTPATIENT
Start: 2021-11-15 | End: 2022-02-14

## 2021-12-01 ENCOUNTER — LAB (OUTPATIENT)
Dept: LAB | Facility: HOSPITAL | Age: 71
End: 2021-12-01

## 2021-12-01 ENCOUNTER — OFFICE VISIT (OUTPATIENT)
Dept: ENDOCRINOLOGY | Facility: CLINIC | Age: 71
End: 2021-12-01

## 2021-12-01 VITALS
SYSTOLIC BLOOD PRESSURE: 122 MMHG | OXYGEN SATURATION: 96 % | HEART RATE: 84 BPM | BODY MASS INDEX: 22.53 KG/M2 | WEIGHT: 132 LBS | DIASTOLIC BLOOD PRESSURE: 76 MMHG | HEIGHT: 64 IN

## 2021-12-01 DIAGNOSIS — E78.00 PURE HYPERCHOLESTEROLEMIA: Primary | ICD-10-CM

## 2021-12-01 DIAGNOSIS — E03.9 ACQUIRED HYPOTHYROIDISM: ICD-10-CM

## 2021-12-01 DIAGNOSIS — M79.18 RIGHT BUTTOCK PAIN: ICD-10-CM

## 2021-12-01 DIAGNOSIS — E55.9 VITAMIN D DEFICIENCY, UNSPECIFIED: ICD-10-CM

## 2021-12-01 LAB — ERYTHROCYTE [SEDIMENTATION RATE] IN BLOOD: 12 MM/HR (ref 0–30)

## 2021-12-01 PROCEDURE — 99214 OFFICE O/P EST MOD 30 MIN: CPT | Performed by: INTERNAL MEDICINE

## 2021-12-01 PROCEDURE — 80053 COMPREHEN METABOLIC PANEL: CPT | Performed by: INTERNAL MEDICINE

## 2021-12-01 PROCEDURE — 82306 VITAMIN D 25 HYDROXY: CPT | Performed by: INTERNAL MEDICINE

## 2021-12-01 PROCEDURE — 84443 ASSAY THYROID STIM HORMONE: CPT | Performed by: INTERNAL MEDICINE

## 2021-12-01 PROCEDURE — 84439 ASSAY OF FREE THYROXINE: CPT | Performed by: INTERNAL MEDICINE

## 2021-12-01 PROCEDURE — 80061 LIPID PANEL: CPT | Performed by: INTERNAL MEDICINE

## 2021-12-01 PROCEDURE — 85652 RBC SED RATE AUTOMATED: CPT | Performed by: INTERNAL MEDICINE

## 2021-12-01 NOTE — PROGRESS NOTES
Beverley Yoo 71 y.o.  CC: Hypothyroidism (follow up )      [place]: Hypothyroidism (follow up )    Taking synthroid 100 mcg daily   No problems with medication   Energy is fair   C/o right hip pain worse with sitting - starts as dull ache after 30 min of sitting  Worst in car  No pain with activity or exercise  No benefit with therapy     No Known Allergies    Current Outpatient Medications:   •  Calcium-Magnesium-Vitamin D (CALCIUM 500 PO), Take  by mouth 2 (Two) Times a Day., Disp: , Rfl:   •  estradiol (VAGIFEM) 10 MCG tablet vaginal tablet, INSERT 1 TABLET VAGINALLY THREE TIMES A WEEK, Disp: 48 tablet, Rfl: 3  •  Fish Oil-Cholecalciferol (OMEGA-3 + D PO), Take  by mouth 2 (Two) Times a Day., Disp: , Rfl:   •  FLUZONE HIGH-DOSE 0.5 ML suspension prefilled syringe injection, , Disp: , Rfl: 0  •  Multiple Vitamins-Minerals (MULTIVITAMIN ADULTS PO), Take  by mouth., Disp: , Rfl:   •  Synthroid 100 MCG tablet, TAKE 1 TABLET DAILY, Disp: 90 tablet, Rfl: 0  •  tretinoin (RETIN-A) 0.05 % cream, [facility] [place], Disp: , Rfl: 11  Patient Active Problem List    Diagnosis    • Right buttock pain [M79.18]    • Family history of glaucoma [Z83.511]    • Hypermetropia [H52.00]    • Posterior vitreous detachment [H43.819]    • Presbyopia [H52.4]    • Regular astigmatism [H52.229]    • Left breast lump [N63.20]    • Asymptomatic postmenopausal state [Z78.0]    • Urinary, incontinence, stress female [N39.3]    • Pyriformis syndrome, unspecified laterality [G57.00]    • PAC (premature atrial contraction) [I49.1]    • Acquired hallux rigidus of left foot [M20.22]    • 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 Negative for activity change, appetite change and unexpected weight change.   HENT: Negative for congestion and rhinorrhea.    Eyes: Negative for visual disturbance.   Respiratory: Negative for cough and shortness of breath.    Cardiovascular: Negative for palpitations and leg swelling.   Gastrointestinal: Negative for constipation, diarrhea and nausea.   Genitourinary: Negative for hematuria.   Musculoskeletal: Negative for arthralgias, back pain, gait problem, joint swelling and myalgias.   Skin: Negative for color change, rash and wound.   Allergic/Immunologic: Negative for environmental allergies, food allergies and immunocompromised state.   Neurological: Negative for dizziness, weakness and light-headedness.   Psychiatric/Behavioral: Negative for confusion, decreased concentration, dysphoric mood and sleep disturbance. The patient is not nervous/anxious.      Social History     Socioeconomic History   • Marital status:    Tobacco Use   • Smoking status: Never Smoker   • Smokeless tobacco: Never Used   Vaping Use   • Vaping Use: Never used   Substance and Sexual Activity   • Alcohol use: Yes     Alcohol/week: 3.0 standard drinks     Types: 3 Glasses of wine per week     Comment: 1 glass of wine 5 days per week   • Drug use: No   • Sexual activity: Yes     Partners: Male     Birth control/protection: Post-menopausal, Surgical     Family History   Problem Relation Age of Onset   • Cancer Paternal Grandfather         BREAST    • Breast cancer Mother 60   • Cancer Mother         Breast CA   • Thyroid disease Mother    • Breast cancer Sister 60   • Breast cancer Maternal Grandmother 60   • Cancer Maternal Grandmother         Breast CA   • Cancer Father    • Anemia Father    • Hyperlipidemia Father    • Cancer Sister         Breast CA   • Ovarian cancer Neg Hx    • Endometrial cancer Neg Hx  "     /76 (BP Location: Left arm, Patient Position: Sitting, Cuff Size: Adult)   Pulse 84   Ht 162.6 cm (64\")   Wt 59.9 kg (132 lb)   SpO2 96%   BMI 22.66 kg/m²   Physical Exam  Vitals and nursing note reviewed.   Constitutional:       Appearance: Normal appearance. She is well-developed.   HENT:      Head: Normocephalic and atraumatic.   Eyes:      General: Lids are normal.      Extraocular Movements: Extraocular movements intact.      Conjunctiva/sclera: Conjunctivae normal.      Pupils: Pupils are equal, round, and reactive to light.   Neck:      Thyroid: No thyroid mass or thyromegaly.      Vascular: No carotid bruit.      Trachea: Trachea normal. No tracheal deviation.   Cardiovascular:      Rate and Rhythm: Normal rate and regular rhythm.      Pulses: Normal pulses.      Heart sounds: Normal heart sounds. No murmur heard.  No friction rub. No gallop.    Pulmonary:      Effort: Pulmonary effort is normal. No respiratory distress.      Breath sounds: Normal breath sounds. No wheezing.   Musculoskeletal:         General: No deformity. Normal range of motion.      Cervical back: Normal range of motion and neck supple.   Lymphadenopathy:      Cervical: No cervical adenopathy.   Skin:     General: Skin is warm and dry.      Findings: No erythema or rash.      Nails: There is no clubbing.   Neurological:      General: No focal deficit present.      Mental Status: She is alert and oriented to person, place, and time.      Cranial Nerves: No cranial nerve deficit.      Deep Tendon Reflexes: Reflexes are normal and symmetric. Reflexes normal.   Psychiatric:         Mood and Affect: Mood normal.         Speech: Speech normal.         Behavior: Behavior normal.         Thought Content: Thought content normal.         Judgment: Judgment normal.       Results for orders placed or performed during the hospital encounter of 06/18/21   Tissue Pathology Exam    Specimen: Breast, Right; Tissue   Result Value Ref Range "    Case Report       Surgical Pathology Report                         Case: [id number]                                  Authorizing Provider:  Lida Payne MD     Collected:           06/18/2021 11:32 AM          Ordering Location:     [facility]   Received:            06/18/2021 12:33 PM                                 [facility]                                                           Pathologist:           Hieu Haque MD                                                           Specimen:    Breast, Right, RIGHT BREAST UOQ CALCS                                                      Clinical Information       Right breast upper outer quadrant calcifications      Final Diagnosis       RIGHT BREAST, UPPER OUTER QUADRANT, AFFIRM-STEREOTACTIC BIOPSY:  Fibrocystic change with focal usual duct epithelial hyperplasia.   Dystrophic calcifications identified.   No in-situ or invasive carcinoma.    DGD/mbc       Comment       This report was sent to the radiologist at [facility] on 06/21/2021.       Gross Description          Specimen is received in formalin labeled right breast affirm stereotactic biopsy consists of 2 cassettes, 1 of which is designated with a Steri-Strip. The undesignated cassette contains a 2.5 x 2.5 x 0.4 cm aggregate of yellow tan-pink fibrofatty breast tissue fragments which were placed in formalin at 1132 on 6/18/2021 and are now submitted in block 1A. Dictate, designated cassette contains a 2.5 x 2.2 x 0.4 cm aggregate of yellow-pink fibrofatty breast tissue cores which were placed in formalin at 11:32 AM on 6/18/2021 and are now submitted in block 1B. Cold ischemic time is less than 60 min, total time in formalin is greater than 6 and less than 72 h. HM      Microscopic Description       Sections of the needle core biopsy show fibrous and adipose connective tissue with benign duct and lobular elements. The ducts are frequently  dilated and occasionally show focal duct epithelial hyperplasia without atypia. A number of the dilated ducts are also remarkable for dystrophic calcifications. Lobular elements are unremarkable. Focal apocrine metaplastic change is noted in some of the ducts.        Diagnoses and all orders for this visit:    1. Pure hypercholesterolemia (Primary)  Assessment & Plan:  Update flp   Taking low fat diet     Orders:  -     Comprehensive Metabolic Panel  -     Lipid Panel  -     Vitamin D 25 Hydroxy    2. Acquired hypothyroidism  Assessment & Plan:  Taking synthroid 100 mcg daily   Check tfts     Orders:  -     TSH  -     T4, Free    3. Right buttock pain  Assessment & Plan:  Worse with sitting - likely sciatica  Exercises provided  Check esr     Orders:  -     Sedimentation Rate    4. Vitamin D deficiency, unspecified   -     Vitamin D 25 Hydroxy  Return in about 6 months (around 6/1/2022) for Recheck.    Nela Kenyon MD  Signed Nela Kenyon MD

## 2021-12-02 LAB
25(OH)D3 SERPL-MCNC: 38.9 NG/ML
ALBUMIN SERPL-MCNC: 4.6 G/DL (ref 3.5–5.2)
ALBUMIN/GLOB SERPL: 1.8 G/DL
ALP SERPL-CCNC: 100 U/L (ref 39–117)
ALT SERPL W P-5'-P-CCNC: 24 U/L (ref 1–33)
ANION GAP SERPL CALCULATED.3IONS-SCNC: 7 MMOL/L (ref 5–15)
AST SERPL-CCNC: 35 U/L (ref 1–32)
BILIRUB SERPL-MCNC: 0.4 MG/DL (ref 0–1.2)
BUN SERPL-MCNC: 11 MG/DL (ref 8–23)
BUN/CREAT SERPL: 12.9 (ref 7–25)
CALCIUM SPEC-SCNC: 10.4 MG/DL (ref 8.6–10.5)
CHLORIDE SERPL-SCNC: 103 MMOL/L (ref 98–107)
CHOLEST SERPL-MCNC: 242 MG/DL (ref 0–200)
CO2 SERPL-SCNC: 29 MMOL/L (ref 22–29)
CREAT SERPL-MCNC: 0.85 MG/DL (ref 0.57–1)
GFR SERPL CREATININE-BSD FRML MDRD: 66 ML/MIN/1.73
GLOBULIN UR ELPH-MCNC: 2.6 GM/DL
GLUCOSE SERPL-MCNC: 88 MG/DL (ref 65–99)
HDLC SERPL-MCNC: 108 MG/DL (ref 40–60)
LDLC SERPL CALC-MCNC: 119 MG/DL (ref 0–100)
LDLC/HDLC SERPL: 1.07 {RATIO}
POTASSIUM SERPL-SCNC: 4.7 MMOL/L (ref 3.5–5.2)
PROT SERPL-MCNC: 7.2 G/DL (ref 6–8.5)
SODIUM SERPL-SCNC: 139 MMOL/L (ref 136–145)
T4 FREE SERPL-MCNC: 1.27 NG/DL (ref 0.93–1.7)
TRIGL SERPL-MCNC: 92 MG/DL (ref 0–150)
TSH SERPL DL<=0.05 MIU/L-ACNC: 1.29 UIU/ML (ref 0.27–4.2)
VLDLC SERPL-MCNC: 15 MG/DL (ref 5–40)

## 2021-12-18 RX ORDER — ATORVASTATIN CALCIUM 10 MG/1
TABLET, FILM COATED ORAL
Qty: 30 TABLET | Refills: 11 | Status: SHIPPED | OUTPATIENT
Start: 2021-12-18 | End: 2023-01-05

## 2022-01-24 ENCOUNTER — TRANSCRIBE ORDERS (OUTPATIENT)
Dept: ADMINISTRATIVE | Facility: HOSPITAL | Age: 72
End: 2022-01-24

## 2022-01-24 DIAGNOSIS — R92.8 ABNORMAL MAMMOGRAM: Primary | ICD-10-CM

## 2022-01-26 ENCOUNTER — TELEPHONE (OUTPATIENT)
Dept: ENDOCRINOLOGY | Facility: CLINIC | Age: 72
End: 2022-01-26

## 2022-01-26 ENCOUNTER — HOSPITAL ENCOUNTER (OUTPATIENT)
Dept: GENERAL RADIOLOGY | Facility: HOSPITAL | Age: 72
Discharge: HOME | End: 2022-01-26
Admitting: INTERNAL MEDICINE

## 2022-01-26 DIAGNOSIS — M25.551 RIGHT HIP PAIN: ICD-10-CM

## 2022-01-26 DIAGNOSIS — G57.00 PYRIFORMIS SYNDROME, UNSPECIFIED LATERALITY: Primary | ICD-10-CM

## 2022-01-26 PROCEDURE — 72110 X-RAY EXAM L-2 SPINE 4/>VWS: CPT

## 2022-01-26 PROCEDURE — 73502 X-RAY EXAM HIP UNI 2-3 VIEWS: CPT

## 2022-02-08 ENCOUNTER — TELEPHONE (OUTPATIENT)
Dept: ENDOCRINOLOGY | Facility: CLINIC | Age: 72
End: 2022-02-08

## 2022-02-08 DIAGNOSIS — M85.88 OSTEOPENIA OF LUMBAR SPINE: Primary | ICD-10-CM

## 2022-02-08 DIAGNOSIS — Z78.0 ASYMPTOMATIC POSTMENOPAUSAL STATE: ICD-10-CM

## 2022-02-14 RX ORDER — LEVOTHYROXINE SODIUM 100 MCG
TABLET ORAL
Qty: 90 TABLET | Refills: 3 | Status: SHIPPED | OUTPATIENT
Start: 2022-02-14 | End: 2022-05-23 | Stop reason: SDUPTHER

## 2022-05-04 ENCOUNTER — APPOINTMENT (OUTPATIENT)
Dept: BONE DENSITY | Facility: HOSPITAL | Age: 72
End: 2022-05-04

## 2022-05-18 ENCOUNTER — TELEPHONE (OUTPATIENT)
Dept: ENDOCRINOLOGY | Facility: CLINIC | Age: 72
End: 2022-05-18

## 2022-05-20 ENCOUNTER — PATIENT MESSAGE (OUTPATIENT)
Dept: ENDOCRINOLOGY | Facility: CLINIC | Age: 72
End: 2022-05-20

## 2022-05-23 RX ORDER — LEVOTHYROXINE SODIUM 100 MCG
100 TABLET ORAL DAILY
Qty: 30 TABLET | Refills: 3 | Status: SHIPPED | OUTPATIENT
Start: 2022-05-23 | End: 2023-01-09 | Stop reason: SDUPTHER

## 2022-06-01 ENCOUNTER — TELEPHONE (OUTPATIENT)
Dept: ENDOCRINOLOGY | Facility: CLINIC | Age: 72
End: 2022-06-01

## 2022-06-01 DIAGNOSIS — M79.18 RIGHT BUTTOCK PAIN: Primary | ICD-10-CM

## 2022-06-01 DIAGNOSIS — M25.551 RIGHT HIP PAIN: ICD-10-CM

## 2022-07-05 ENCOUNTER — OFFICE VISIT (OUTPATIENT)
Dept: ORTHOPEDIC SURGERY | Facility: CLINIC | Age: 72
End: 2022-07-05

## 2022-07-05 VITALS
BODY MASS INDEX: 22.53 KG/M2 | WEIGHT: 132 LBS | HEIGHT: 64 IN | SYSTOLIC BLOOD PRESSURE: 124 MMHG | DIASTOLIC BLOOD PRESSURE: 82 MMHG

## 2022-07-05 DIAGNOSIS — M25.552 HIP PAIN, BILATERAL: ICD-10-CM

## 2022-07-05 DIAGNOSIS — M25.551 HIP PAIN, BILATERAL: ICD-10-CM

## 2022-07-05 DIAGNOSIS — M70.61 TROCHANTERIC BURSITIS OF RIGHT HIP: ICD-10-CM

## 2022-07-05 DIAGNOSIS — M70.71 ISCHIAL BURSITIS OF RIGHT SIDE: Primary | ICD-10-CM

## 2022-07-05 PROCEDURE — 99204 OFFICE O/P NEW MOD 45 MIN: CPT | Performed by: ORTHOPAEDIC SURGERY

## 2022-07-05 RX ORDER — VALACYCLOVIR HYDROCHLORIDE 500 MG/1
500 TABLET, FILM COATED ORAL DAILY
COMMUNITY
Start: 2022-07-02 | End: 2023-01-09

## 2022-07-05 RX ORDER — METHYLPREDNISOLONE 4 MG/1
TABLET ORAL
Qty: 21 TABLET | Refills: 0 | Status: SHIPPED | OUTPATIENT
Start: 2022-07-05 | End: 2023-01-09

## 2022-07-05 NOTE — PROGRESS NOTES
Orthopaedic Clinic Note: Hip New Patient    Chief Complaint   Patient presents with   • Right Hip - Pain        HPI  Consult from: Nela Kenyon MD    Beverley Yoo is a 71 y.o. female who presents with right hip pain for 3 year(s). Onset atraumatic and gradual in nature. Pain is localized to gluteal region and lateral trochanter and is a 5/10 on the pain scale.Pain is described as aching. Associated symptoms include pain.  The pain is worse with sitting and driving; ice and pain medication and/or NSAID improve the pain. Previous treatments have included: NSAIDS and physical therapy since symptom onset. Although some transient relief was reported with these interventions, these conservative measures have failed and symptoms have persisted. The patient is limited in daily activities and has had a significant decrease in quality of life as a result. She denies fevers, chills, or constitutional symptoms.    I have reviewed the following portions of the patient's history:History of Present Illness    Past Medical History:   Diagnosis Date   • Abnormal ECG 08/16/2018   • Arrhythmia 07/25/2018    PVCs   • Arthritis of back     ? Please look at xray & see if arthritis.   • Bursitis of hip 2019   • Encounter for screening colonoscopy     diverticulosis- Gem 5/12 repeat 10 years   • Encounter for screening for osteoporosis     epeat dexa 2015 Last Impression: 18 Apr 2014  dexa 2/12 low bone density  Nela Kenyon   • Fracture of ankle 1980?   • Fracture, clavicle     As an elementary school age child   • Hyperlipidemia ~1980   • Hypothyroidism ~   • Osteopenia    • Periarthritis of shoulder    • Plantar fascial fibromatosis of right foot    • Primary hyperparathyroidism (HCC)       Past Surgical History:   Procedure Laterality Date   • BREAST BIOPSY Bilateral     Stereo - benign   • BREAST BIOPSY Bilateral     benign   • BREAST EXCISIONAL BIOPSY Right     benign   • DENTAL PROCEDURE      HISTORY OF DENTAL  SURGERY    • HYSTERECTOMY  ~1999   • OOPHORECTOMY Bilateral ~1999   • TRIGGER POINT INJECTION  1987      Family History   Problem Relation Age of Onset   • Cancer Paternal Grandfather         BREAST    • Breast cancer Mother 60   • Cancer Mother         Breast CA   • Thyroid disease Mother    • Breast cancer Sister 60   • Breast cancer Maternal Grandmother 60   • Cancer Maternal Grandmother         Breast CA   • Cancer Father    • Anemia Father    • Hyperlipidemia Father    • Cancer Sister         Breast CA   • Ovarian cancer Neg Hx    • Endometrial cancer Neg Hx      Social History     Socioeconomic History   • Marital status:    Tobacco Use   • Smoking status: Never Smoker   • Smokeless tobacco: Never Used   Vaping Use   • Vaping Use: Never used   Substance and Sexual Activity   • Alcohol use: Yes     Alcohol/week: 3.0 standard drinks     Types: 3 Glasses of wine per week     Comment: 1 glass of wine 5 days per week   • Drug use: No   • Sexual activity: Yes     Partners: Male     Birth control/protection: Post-menopausal, Surgical      Current Outpatient Medications on File Prior to Visit   Medication Sig Dispense Refill   • atorvastatin (LIPITOR) 10 MG tablet TAKE 1 TABLET DAILY 30 tablet 11   • Calcium-Magnesium-Vitamin D (CALCIUM 500 PO) Take  by mouth 2 (Two) Times a Day.     • Multiple Vitamins-Minerals (MULTIVITAMIN ADULTS PO) Take  by mouth.     • Synthroid 100 MCG tablet Take 1 tablet by mouth Daily. 30 tablet 3   • estradiol (VAGIFEM) 10 MCG tablet vaginal tablet INSERT 1 TABLET VAGINALLY THREE TIMES A WEEK 48 tablet 3   • Fish Oil-Cholecalciferol (OMEGA-3 + D PO) Take  by mouth 2 (Two) Times a Day.     • FLUZONE HIGH-DOSE 0.5 ML suspension prefilled syringe injection   0   • tretinoin (RETIN-A) 0.05 % cream ANALIA AA HS  11   • valACYclovir (VALTREX) 500 MG tablet Take 500 mg by mouth Daily.       No current facility-administered medications on file prior to visit.      No Known Allergies     Review  "of Systems   Constitutional: Negative.    HENT: Negative.    Eyes: Negative.    Respiratory: Negative.    Cardiovascular: Negative.    Gastrointestinal: Negative.    Endocrine: Negative.    Genitourinary: Negative.    Musculoskeletal: Positive for arthralgias and back pain.   Skin: Negative.    Allergic/Immunologic: Positive for environmental allergies.   Neurological: Negative.    Hematological: Negative.    Psychiatric/Behavioral: Negative.         The patient's Review of Systems was personally reviewed and confirmed as accurate.    The following portions of the patient's history were reviewed and updated as appropriate: allergies, current medications, past family history, past medical history, past social history, past surgical history and problem list.    Physical Exam  Blood pressure 124/82, height 162.6 cm (64.02\"), weight 59.9 kg (132 lb), not currently breastfeeding.    Body mass index is 22.65 kg/m².    GENERAL APPEARANCE: awake, alert & oriented x 3, in no acute distress and well developed, well nourished  PSYCH: normal affect  LUNGS:  breathing nonlabored  EYES: sclera anicteric  CARDIOVASCULAR: palpable dorsalis pedis, palpable posterior tibial bilaterally. Capillary refill less than 2 seconds  EXTREMITIES: no clubbing, cyanosis  GAIT:  Normal           Right Hip Exam:  RANGE OF MOTION:   FLEXION CONTRACTURE: None   FLEXION: 110 degrees   INTERNAL ROTATION: 20 degrees at 90 degrees of flexion   EXTERNAL ROTATION: 40 degrees at 90 degrees of flexion    PAIN WITH HIP MOTION: no  PAIN WITH LOGROLL: no  STINCHFIELD TEST: negative    KNEE EXAM: full knee ROM (0-120 degrees), stable to varus and valgus stress at terminal extension and 30 degrees flexion     STRENGTH:  5/5 hip adduction, abduction, flexion. 5/5 strength knee flexion, extension. 5/5 strength ankle dorsiflexion and plantarflexion.     GREATER TROCHANTER BURSAL PAIN:  Yes  Tender palpation at ischial bursa     REFLEXES:   PATELLAR 2+/4   ACHILLES " 2+/4    CLONUS: negative  STRAIGHT LEG TEST:   negative    SENSATION TO LIGHT TOUCH:  DEEP PERONEAL/SUPERFICIAL PERONEAL/SURAL/SAPHENOUS/TIBIAL:  intact    EDEMA:   no  ERYTHEMA:  no  WOUNDS/INCISIONS: no overlying skin problems.      Left Hip Exam:   RANGE OF MOTION:   FLEXION CONTRACTURE: None   FLEXION: 110 degrees   INTERNAL ROTATION: 20 degrees at 90 degrees of flexion   EXTERNAL ROTATION: 40 degrees at 90 degrees of flexion    PAIN WITH HIP MOTION: no  PAIN WITH LOGROLL: no  STINCHFIELD TEST: negative    KNEE EXAM: full knee ROM (0-120 degrees), stable to varus and valgus stress at terminal extension and 30 degrees flexion     STRENGTH:  5/5 hip adduction, abduction, flexion. 5/5 strength knee flexion, extension. 5/5 strength ankle dorsiflexion and plantarflexion.     GREATER TROCHANTER BURSAL PAIN:  no     REFLEXES:   PATELLAR 2+/4   ACHILLES 2+/4    CLONUS: negative  STRAIGHT LEG TEST:   negative    SENSATION TO LIGHT TOUCH:  DEEP PERONEAL/SUPERFICIAL PERONEAL/SURAL/SAPHENOUS/TIBIAL:  intact    EDEMA:   no  ERYTHEMA:  no  WOUNDS/INCISIONS: no overlying skin problems.      ------------------------------------------------------------------    LEG LENGTHS:  equal  _____________________________________________________  _____________________________________________________    RADIOGRAPHIC FINDINGS:   Indication: Right hip pain    Comparison: Todays xrays were compared to previous xrays from 1/26/2022    AP pelvis, hip 2 views: Right: mild joint space narrowing, minimal osteophyte formation and No significant changes compared to prior radiographs.; Left: mild joint space narrowing, minimal osteophyte formation and No significant changes compared to prior radiographs.    Assessment/Plan:   Diagnosis Plan   1. Ischial bursitis of right side  methylPREDNISolone (MEDROL) 4 MG dose pack   2. Hip pain, bilateral  XR Hips Bilateral With or Without Pelvis 3-4 View   3. Trochanteric bursitis of right hip   methylPREDNISolone (MEDROL) 4 MG dose pack     Patient suffering from ischial bursitis as well as trochanteric bursitis.  I discussed treatment options with her regarding her ongoing pain.  She is agreeable to a Medrol Dosepak to help alleviate the symptoms.  She is not interested in cortisone injection into the ischial or trochanteric bursa today.  I discussed activity modification and hemorrhoid pillow to offset pressures to the ischial bursa.  She will follow-up with me as needed.    Rony Del Rosario MD  07/05/22  13:58 EDT

## 2022-07-28 ENCOUNTER — HOSPITAL ENCOUNTER (OUTPATIENT)
Dept: BONE DENSITY | Facility: HOSPITAL | Age: 72
Discharge: HOME | End: 2022-07-28
Admitting: INTERNAL MEDICINE

## 2022-07-28 PROCEDURE — 77080 DXA BONE DENSITY AXIAL: CPT

## 2022-08-29 ENCOUNTER — APPOINTMENT (OUTPATIENT)
Dept: MAMMOGRAPHY | Facility: HOSPITAL | Age: 72
End: 2022-08-29

## 2022-08-29 ENCOUNTER — HOSPITAL ENCOUNTER (OUTPATIENT)
Dept: MAMMOGRAPHY | Facility: HOSPITAL | Age: 72
Discharge: HOME | End: 2022-08-29
Admitting: INTERNAL MEDICINE

## 2022-08-29 DIAGNOSIS — R92.8 ABNORMAL MAMMOGRAM: ICD-10-CM

## 2022-08-29 PROCEDURE — G0279 TOMOSYNTHESIS, MAMMO: HCPCS

## 2022-08-29 PROCEDURE — 77066 DX MAMMO INCL CAD BI: CPT

## 2022-08-29 PROCEDURE — 77066 DX MAMMO INCL CAD BI: CPT | Performed by: RADIOLOGY

## 2022-08-29 PROCEDURE — G0279 TOMOSYNTHESIS, MAMMO: HCPCS | Performed by: RADIOLOGY

## 2023-01-05 RX ORDER — ATORVASTATIN CALCIUM 10 MG/1
TABLET, FILM COATED ORAL
Qty: 30 TABLET | Refills: 0 | Status: SHIPPED | OUTPATIENT
Start: 2023-01-05 | End: 2023-01-09 | Stop reason: SDUPTHER

## 2023-01-09 ENCOUNTER — OFFICE VISIT (OUTPATIENT)
Dept: ENDOCRINOLOGY | Facility: CLINIC | Age: 73
End: 2023-01-09
Payer: MEDICARE

## 2023-01-09 VITALS
OXYGEN SATURATION: 98 % | WEIGHT: 133.6 LBS | HEIGHT: 64 IN | HEART RATE: 61 BPM | DIASTOLIC BLOOD PRESSURE: 62 MMHG | BODY MASS INDEX: 22.81 KG/M2 | SYSTOLIC BLOOD PRESSURE: 102 MMHG

## 2023-01-09 DIAGNOSIS — E78.00 PURE HYPERCHOLESTEROLEMIA: Primary | ICD-10-CM

## 2023-01-09 DIAGNOSIS — E03.9 ACQUIRED HYPOTHYROIDISM: ICD-10-CM

## 2023-01-09 DIAGNOSIS — E55.9 VITAMIN D DEFICIENCY: ICD-10-CM

## 2023-01-09 DIAGNOSIS — Z76.89 ENCOUNTER TO ESTABLISH CARE: ICD-10-CM

## 2023-01-09 LAB
25(OH)D3 SERPL-MCNC: 41.9 NG/ML (ref 30–100)
ALBUMIN SERPL-MCNC: 5 G/DL (ref 3.5–5.2)
ALBUMIN/GLOB SERPL: 2.6 G/DL
ALP SERPL-CCNC: 114 U/L (ref 39–117)
ALT SERPL W P-5'-P-CCNC: 19 U/L (ref 1–33)
ANION GAP SERPL CALCULATED.3IONS-SCNC: 9.3 MMOL/L (ref 5–15)
AST SERPL-CCNC: 23 U/L (ref 1–32)
BILIRUB SERPL-MCNC: 0.4 MG/DL (ref 0–1.2)
BUN SERPL-MCNC: 15 MG/DL (ref 8–23)
BUN/CREAT SERPL: 16.5 (ref 7–25)
CALCIUM SPEC-SCNC: 10.7 MG/DL (ref 8.6–10.5)
CHLORIDE SERPL-SCNC: 104 MMOL/L (ref 98–107)
CHOLEST SERPL-MCNC: 227 MG/DL (ref 0–200)
CO2 SERPL-SCNC: 25.7 MMOL/L (ref 22–29)
CREAT SERPL-MCNC: 0.91 MG/DL (ref 0.57–1)
EGFRCR SERPLBLD CKD-EPI 2021: 67.6 ML/MIN/1.73
GLOBULIN UR ELPH-MCNC: 1.9 GM/DL
GLUCOSE SERPL-MCNC: 89 MG/DL (ref 65–99)
HDLC SERPL-MCNC: 97 MG/DL (ref 40–60)
LDLC SERPL CALC-MCNC: 110 MG/DL (ref 0–100)
LDLC/HDLC SERPL: 1.1 {RATIO}
POTASSIUM SERPL-SCNC: 4.7 MMOL/L (ref 3.5–5.2)
PROT SERPL-MCNC: 6.9 G/DL (ref 6–8.5)
SODIUM SERPL-SCNC: 139 MMOL/L (ref 136–145)
T4 FREE SERPL-MCNC: 1.26 NG/DL (ref 0.93–1.7)
TRIGL SERPL-MCNC: 116 MG/DL (ref 0–150)
TSH SERPL DL<=0.05 MIU/L-ACNC: 0.45 UIU/ML (ref 0.27–4.2)
VLDLC SERPL-MCNC: 20 MG/DL (ref 5–40)

## 2023-01-09 PROCEDURE — 99214 OFFICE O/P EST MOD 30 MIN: CPT | Performed by: INTERNAL MEDICINE

## 2023-01-09 PROCEDURE — 84443 ASSAY THYROID STIM HORMONE: CPT | Performed by: INTERNAL MEDICINE

## 2023-01-09 PROCEDURE — 36415 COLL VENOUS BLD VENIPUNCTURE: CPT | Performed by: INTERNAL MEDICINE

## 2023-01-09 PROCEDURE — 84439 ASSAY OF FREE THYROXINE: CPT | Performed by: INTERNAL MEDICINE

## 2023-01-09 PROCEDURE — 80061 LIPID PANEL: CPT | Performed by: INTERNAL MEDICINE

## 2023-01-09 PROCEDURE — 82306 VITAMIN D 25 HYDROXY: CPT | Performed by: INTERNAL MEDICINE

## 2023-01-09 PROCEDURE — 80053 COMPREHEN METABOLIC PANEL: CPT | Performed by: INTERNAL MEDICINE

## 2023-01-09 RX ORDER — LEVOTHYROXINE SODIUM 100 MCG
100 TABLET ORAL DAILY
Qty: 90 TABLET | Refills: 1 | Status: SHIPPED | OUTPATIENT
Start: 2023-01-09

## 2023-01-09 RX ORDER — ATORVASTATIN CALCIUM 10 MG/1
10 TABLET, FILM COATED ORAL DAILY
Qty: 90 TABLET | Refills: 1 | Status: SHIPPED | OUTPATIENT
Start: 2023-01-09

## 2023-01-09 RX ORDER — ESTRADIOL 10 UG/1
INSERT VAGINAL
Qty: 45 TABLET | Refills: 1 | Status: SHIPPED | OUTPATIENT
Start: 2023-01-09

## 2023-01-09 NOTE — PROGRESS NOTES
Beverley Yoo 72 y.o.  CC:Follow-up, Hypothyroidism, and Hyperlipidemia      [place]: Follow-up, Hypothyroidism, and Hyperlipidemia    Is taking synthroid 100 mcg daily   Is taking lipitor 10 mg daily   Doing well overall  oa c/o     No Known Allergies    Current Outpatient Medications:   •  atorvastatin (LIPITOR) 10 MG tablet, Take 1 tablet by mouth Daily., Disp: 90 tablet, Rfl: 1  •  estradiol (VAGIFEM) 10 MCG tablet vaginal tablet, INSERT 1 TABLET VAGINALLY 2 times per week, Disp: 45 tablet, Rfl: 1  •  Synthroid 100 MCG tablet, Take 1 tablet by mouth Daily., Disp: 90 tablet, Rfl: 1  •  Calcium-Magnesium-Vitamin D (CALCIUM 500 PO), Take  by mouth 2 (Two) Times a Day., Disp: , Rfl:   •  Fish Oil-Cholecalciferol (OMEGA-3 + D PO), Take  by mouth 2 (Two) Times a Day., Disp: , Rfl:   •  FLUZONE HIGH-DOSE 0.5 ML suspension prefilled syringe injection, , Disp: , Rfl: 0  •  Multiple Vitamins-Minerals (MULTIVITAMIN ADULTS PO), Take  by mouth., Disp: , Rfl:   •  tretinoin (RETIN-A) 0.05 % cream, ANALIA AA HS, Disp: , Rfl: 11  Patient Active Problem List    Diagnosis    • Right buttock pain [M79.18]    • Family history of glaucoma [Z83.511]    • Hypermetropia [H52.00]    • Posterior vitreous detachment [H43.819]    • Presbyopia [H52.4]    • Regular astigmatism [H52.229]    • Left breast lump [N63.20]    • Asymptomatic postmenopausal state [Z78.0]    • Urinary, incontinence, stress female [N39.3]    • Pyriformis syndrome, unspecified laterality [G57.00]    • PAC (premature atrial contraction) [I49.1]    • Acquired hallux rigidus of left foot [M20.22]    • Plantar fascial fibromatosis of right foot [M72.2]    • Primary hyperparathyroidism (HCC) [E21.0]    • H/O colonoscopy [Z98.890]    • Hypocalcemia [E83.51]    • Menopausal symptoms [N95.1]    • Osteopenia [M85.80]    • Seasonal allergies [J30.2]    • Vitamin D deficiency [E55.9]    • Fibrocystic breast disease [N60.19]    • Hypercalcemia [E83.52]    • Hyperlipidemia [E78.5]    •  Hypothyroidism [E03.9]    • Mammogram abnormal [R92.8]      Review of Systems   Constitutional: Negative for activity change, appetite change and unexpected weight change.   HENT: Negative for congestion and rhinorrhea.    Eyes: Negative for visual disturbance.   Respiratory: Negative for cough and shortness of breath.    Cardiovascular: Negative for palpitations and leg swelling.   Gastrointestinal: Negative for constipation, diarrhea and nausea.   Genitourinary: Negative for hematuria.   Musculoskeletal: Negative for arthralgias, back pain, gait problem, joint swelling and myalgias.   Skin: Negative for color change, rash and wound.   Allergic/Immunologic: Negative for environmental allergies, food allergies and immunocompromised state.   Neurological: Negative for dizziness, weakness and light-headedness.   Psychiatric/Behavioral: Negative for confusion, decreased concentration, dysphoric mood and sleep disturbance. The patient is not nervous/anxious.      Social History     Socioeconomic History   • Marital status:    Tobacco Use   • Smoking status: Never   • Smokeless tobacco: Never   Vaping Use   • Vaping Use: Never used   Substance and Sexual Activity   • Alcohol use: Yes     Alcohol/week: 3.0 standard drinks     Types: 3 Glasses of wine per week     Comment: 1 glass of wine 5 days per week   • Drug use: No   • Sexual activity: Yes     Partners: Male     Birth control/protection: Surgical, Post-menopausal     Family History   Problem Relation Age of Onset   • Cancer Paternal Grandfather         BREAST    • Breast cancer Mother 60   • Cancer Mother         Breast CA   • Thyroid disease Mother    • Breast cancer Sister 60   • Breast cancer Maternal Grandmother 60   • Cancer Maternal Grandmother         Breast CA   • Cancer Father    • Anemia Father    • Hyperlipidemia Father    • Cancer Sister         Breast Cancer   • Ovarian cancer Neg Hx    • Endometrial cancer Neg Hx      /62   Pulse 61   Ht  162.6 cm (64\")   Wt 60.6 kg (133 lb 9.6 oz)   SpO2 98%   BMI 22.93 kg/m²   Physical Exam  Vitals and nursing note reviewed.   Constitutional:       Appearance: Normal appearance. She is well-developed.   HENT:      Head: Normocephalic and atraumatic.   Eyes:      General: Lids are normal.      Extraocular Movements: Extraocular movements intact.      Conjunctiva/sclera: Conjunctivae normal.      Pupils: Pupils are equal, round, and reactive to light.   Neck:      Thyroid: No thyroid mass or thyromegaly.      Vascular: No carotid bruit.      Trachea: Trachea normal. No tracheal deviation.   Cardiovascular:      Rate and Rhythm: Normal rate and regular rhythm.      Pulses: Normal pulses.      Heart sounds: Normal heart sounds. No murmur heard.    No friction rub. No gallop.   Pulmonary:      Effort: Pulmonary effort is normal. No respiratory distress.      Breath sounds: Normal breath sounds. No wheezing.   Musculoskeletal:         General: No deformity. Normal range of motion.      Cervical back: Normal range of motion and neck supple.   Lymphadenopathy:      Cervical: No cervical adenopathy.   Skin:     General: Skin is warm and dry.      Findings: No erythema or rash.      Nails: There is no clubbing.   Neurological:      General: No focal deficit present.      Mental Status: She is alert and oriented to person, place, and time.      Cranial Nerves: No cranial nerve deficit.      Deep Tendon Reflexes: Reflexes are normal and symmetric. Reflexes normal.   Psychiatric:         Mood and Affect: Mood normal.         Speech: Speech normal.         Behavior: Behavior normal.         Thought Content: Thought content normal.         Judgment: Judgment normal.       Results for orders placed or performed in visit on 12/01/21   Comprehensive Metabolic Panel    Specimen: Blood   Result Value Ref Range    Glucose 88 65 - 99 mg/dL    BUN 11 8 - 23 mg/dL    Creatinine 0.85 0.57 - 1.00 mg/dL    Sodium 139 136 - 145 mmol/L     Potassium 4.7 3.5 - 5.2 mmol/L    Chloride 103 98 - 107 mmol/L    CO2 29.0 22.0 - 29.0 mmol/L    Calcium 10.4 8.6 - 10.5 mg/dL    Total Protein 7.2 6.0 - 8.5 g/dL    Albumin 4.60 3.50 - 5.20 g/dL    ALT (SGPT) 24 1 - 33 U/L    AST (SGOT) 35 (H) 1 - 32 U/L    Alkaline Phosphatase 100 39 - 117 U/L    Total Bilirubin 0.4 0.0 - 1.2 mg/dL    eGFR Non African Amer 66 >60 mL/min/1.73    Globulin 2.6 gm/dL    A/G Ratio 1.8 g/dL    BUN/Creatinine Ratio 12.9 7.0 - 25.0    Anion Gap 7.0 5.0 - 15.0 mmol/L   Lipid Panel    Specimen: Blood   Result Value Ref Range    Total Cholesterol 242 (H) 0 - 200 mg/dL    Triglycerides 92 0 - 150 mg/dL    HDL Cholesterol 108 (H) 40 - 60 mg/dL    LDL Cholesterol  119 (H) 0 - 100 mg/dL    VLDL Cholesterol 15 5 - 40 mg/dL    LDL/HDL Ratio 1.07    TSH    Specimen: Blood   Result Value Ref Range    TSH 1.290 0.270 - 4.200 uIU/mL   T4, Free    Specimen: Blood   Result Value Ref Range    Free T4 1.27 0.93 - 1.70 ng/dL   Vitamin D 25 Hydroxy    Specimen: Blood   Result Value Ref Range    25 Hydroxy, Vitamin D 38.9 ng/ml   Sedimentation Rate    Specimen: Blood   Result Value Ref Range    Sed Rate 12 0 - 30 mm/hr     Diagnoses and all orders for this visit:    1. Pure hypercholesterolemia (Primary)  Assessment & Plan:  Is eating low fat diet, taking lipitor 10 mg daily   Check flp     Orders:  -     Comprehensive Metabolic Panel; Future  -     Lipid Panel; Future  -     Comprehensive Metabolic Panel  -     Lipid Panel    2. Acquired hypothyroidism  Assessment & Plan:  Update tfts  Taking synthroid 100 mcg daily     Orders:  -     TSH; Future  -     T4, Free; Future  -     TSH  -     T4, Free    3. Vitamin D deficiency  Assessment & Plan:  Is taking supplement, walking daily   dexa with low bone density and some bone loss compared to prior testing  Continue to monitor with repeat dexa in 2 years      Orders:  -     Vitamin D,25-Hydroxy; Future  -     Vitamin D,25-Hydroxy    4. Encounter to establish  care  -     Ambulatory Referral to Internal Medicine    Other orders  -     Synthroid 100 MCG tablet; Take 1 tablet by mouth Daily.  Dispense: 90 tablet; Refill: 1  -     estradiol (VAGIFEM) 10 MCG tablet vaginal tablet; INSERT 1 TABLET VAGINALLY 2 times per week  Dispense: 45 tablet; Refill: 1  -     atorvastatin (LIPITOR) 10 MG tablet; Take 1 tablet by mouth Daily.  Dispense: 90 tablet; Refill: 1  Return in about 6 months (around 7/9/2023) for Recheck.    Nela Kenyon MD  Signed Nela Kenyon MD

## 2023-01-09 NOTE — ASSESSMENT & PLAN NOTE
Is taking supplement, walking daily   dexa with low bone density and some bone loss compared to prior testing  Continue to monitor with repeat dexa in 2 years

## 2023-01-25 ENCOUNTER — OFFICE VISIT (OUTPATIENT)
Dept: INTERNAL MEDICINE | Facility: CLINIC | Age: 73
End: 2023-01-25
Payer: MEDICARE

## 2023-01-25 VITALS
DIASTOLIC BLOOD PRESSURE: 80 MMHG | OXYGEN SATURATION: 98 % | BODY MASS INDEX: 22.88 KG/M2 | SYSTOLIC BLOOD PRESSURE: 150 MMHG | HEART RATE: 79 BPM | WEIGHT: 134 LBS | HEIGHT: 64 IN | TEMPERATURE: 97 F

## 2023-01-25 DIAGNOSIS — E03.9 ACQUIRED HYPOTHYROIDISM: ICD-10-CM

## 2023-01-25 DIAGNOSIS — E21.0 PRIMARY HYPERPARATHYROIDISM: ICD-10-CM

## 2023-01-25 DIAGNOSIS — Z00.00 MEDICARE ANNUAL WELLNESS VISIT, SUBSEQUENT: ICD-10-CM

## 2023-01-25 DIAGNOSIS — Z23 NEED FOR VACCINATION: ICD-10-CM

## 2023-01-25 DIAGNOSIS — Z76.89 ENCOUNTER TO ESTABLISH CARE: Primary | ICD-10-CM

## 2023-01-25 DIAGNOSIS — M85.80 OSTEOPENIA, UNSPECIFIED LOCATION: ICD-10-CM

## 2023-01-25 DIAGNOSIS — E78.00 PURE HYPERCHOLESTEROLEMIA: ICD-10-CM

## 2023-01-25 PROCEDURE — 1126F AMNT PAIN NOTED NONE PRSNT: CPT | Performed by: NURSE PRACTITIONER

## 2023-01-25 PROCEDURE — 90471 IMMUNIZATION ADMIN: CPT | Performed by: NURSE PRACTITIONER

## 2023-01-25 PROCEDURE — G0439 PPPS, SUBSEQ VISIT: HCPCS | Performed by: NURSE PRACTITIONER

## 2023-01-25 PROCEDURE — 1170F FXNL STATUS ASSESSED: CPT | Performed by: NURSE PRACTITIONER

## 2023-01-25 PROCEDURE — 1159F MED LIST DOCD IN RCRD: CPT | Performed by: NURSE PRACTITIONER

## 2023-01-25 PROCEDURE — 90715 TDAP VACCINE 7 YRS/> IM: CPT | Performed by: NURSE PRACTITIONER

## 2023-01-25 NOTE — PROGRESS NOTES
Office Note     Name: Beverley Yoo    : 1950     MRN: [id number]     Chief Complaint  Establish Care and Hypothyroidism    Subjective     History of Present Illness:  Beverley Yoo is a 72 y.o. female who presents today to establish care with a new provider and for subsequent Medicare wellness visit.    The patient is being seen for a health maintenance evaluation.    Hypothyroidism: Followed by Dr. Kenyon with endocrinology.  Is currently on Synthroid 100 mcg daily.  Sees endocrinology every 6 months.    Hyperparathyroidism, primary: Followed by Dr. Kenyon with endocrinology.    Osteopenia: Currently on calcium and vitamin D supplement.    Familial hyperlipidemia: Currently on atorvastatin 10 mg daily and fish oil twice daily.    Lab work done by endocrinology in 2023.  Lab results reviewed with patient.  Patient reports blood pressure is normally 115-120 over 65-70s at home.  She has not on blood pressure medication.  She is a retired RN and retired about 2 years ago.  She has been on a statin for a little over a year now.  Flu vaccine is up-to-date.  COVID-vaccine and boosters are up-to-date.  She is due for a colonoscopy and will notify me when she is ready for that to be scheduled.  She is up-to-date on mammogram.  She no longer requires Pap smear screening as she had a hysterectomy.  She denies tobacco or recreational drug use.  She is currently doing a dry January but typically likes about 2 to 3 glasses of wine per week.    Past Medical History:   Diagnosis Date   • Abnormal ECG 2018   • Arrhythmia 2018    PVCs   • Arthritis of back     ? Please look at xray & see if arthritis.   • Bursitis of hip    • Encounter for screening colonoscopy     diverticulosis- Gem  repeat 10 years   • Encounter for screening for osteoporosis     epeat dexa  Last Impression: 2014  dexa  low bone density  Nela Kenyon   • Fracture of ankle ?   •  Fracture, clavicle     As an elementary school age child   • Hyperlipidemia ~1980   • Hypothyroidism ~   • Osteopenia    • Periarthritis of shoulder    • Plantar fascial fibromatosis of right foot    • Primary hyperparathyroidism (HCC)        Past Surgical History:   Procedure Laterality Date   • BREAST BIOPSY Bilateral     Stereo - benign   • BREAST BIOPSY Bilateral     benign   • BREAST EXCISIONAL BIOPSY Right     benign   • DENTAL PROCEDURE      HISTORY OF DENTAL SURGERY    • HYSTERECTOMY  ~1999   • OOPHORECTOMY Bilateral ~1999   • TRIGGER POINT INJECTION  1987       Social History     Socioeconomic History   • Marital status:    Tobacco Use   • Smoking status: Never   • Smokeless tobacco: Never   Vaping Use   • Vaping Use: Never used   Substance and Sexual Activity   • Alcohol use: Yes     Alcohol/week: 3.0 standard drinks     Types: 3 Glasses of wine per week     Comment: 1 glass of wine 5 days per week   • Drug use: No   • Sexual activity: Yes     Partners: Male     Birth control/protection: Surgical, Post-menopausal         Current Outpatient Medications:   •  atorvastatin (LIPITOR) 10 MG tablet, Take 1 tablet by mouth Daily., Disp: 90 tablet, Rfl: 1  •  Calcium-Magnesium-Vitamin D (CALCIUM 500 PO), Take  by mouth 2 (Two) Times a Day., Disp: , Rfl:   •  estradiol (VAGIFEM) 10 MCG tablet vaginal tablet, INSERT 1 TABLET VAGINALLY 2 times per week, Disp: 45 tablet, Rfl: 1  •  Fish Oil-Cholecalciferol (OMEGA-3 + D PO), Take  by mouth 2 (Two) Times a Day., Disp: , Rfl:   •  FLUZONE HIGH-DOSE 0.5 ML suspension prefilled syringe injection, , Disp: , Rfl: 0  •  Multiple Vitamins-Minerals (MULTIVITAMIN ADULTS PO), Take  by mouth., Disp: , Rfl:   •  Synthroid 100 MCG tablet, Take 1 tablet by mouth Daily., Disp: 90 tablet, Rfl: 1  •  tretinoin (RETIN-A) 0.05 % cream, ANALIA AA HS, Disp: , Rfl: 11    General History  Beverley  does have regular dental visits. Every 6 months.  She does complain of vision problems. Last  "eye exam was April 2022.  Immunizations are not up to date. The patient needs the following immunizations: Tdap and pneumonia vaccine    Lifestyle  Beverley  consumes in general, a \"healthy\" diet  .  She exercises daily.    Reproductive Health  Beverley  is postmenopausal.  She reports periods are none.  Her contraceptive plan is hysterectomy.    Screening  Last pap was 30 years ago.    Last Completed Pap Smear          Discontinued - PAP SMEAR  Discontinued    No completion history exists for this topic.              History of abnormal pap smear or family history of gyn cancer: None.  Last mammogram was August 2022.    Last Completed Mammogram          MAMMOGRAM (Every 2 Years) Next due on 8/29/2024 08/29/2022  Mammo Diagnostic Digital Tomosynthesis Bilateral With CAD    05/25/2021  Mammo Diagnostic Digital Tomosynthesis Bilateral With CAD    05/05/2020  Mammo Diagnostic Digital Tomosynthesis Bilateral With CAD    06/05/2019  Mammo Screening Digital Tomosynthesis Bilateral With CAD    06/04/2018  Mammo Screening Digital Tomosynthesis Bilateral With CAD    Only the first 5 history entries have been loaded, but more history exists.              Personal or family history of abnormal mammograms or breast cancer: Yes, mother, sister, and maternal grandmother with breast cancer. Yes, abnormal mammogram with normal breast bx.    Last colonoscopy was 2012.    Last Completed Colonoscopy     This patient has no relevant Health Maintenance data.        Family history of colon cancer: None.  Last DEXA was July 2022.    Health Maintenance Summary          Overdue - ANNUAL WELLNESS VISIT (Yearly) Overdue - never done    No completion, postpone, or frequency change history exists for this topic.          Overdue - Pneumococcal Vaccine 65+ (2 - PCV) Overdue since 9/12/2018 09/12/2017  Imm Admin: Pneumococcal Polysaccharide (PPSV23)          Overdue - COLORECTAL CANCER SCREENING (COLONOSCOPY - Every 10 Years) Overdue since " 5/1/2022 05/01/2012  HM Colonoscopy component of HM COLONOSCOPY          LIPID PANEL (Yearly) Next due on 1/9/2024 01/09/2023  Lipid Panel    12/01/2021  Lipid Panel    06/01/2021  Lipid Panel    11/16/2020  Lipid Panel    05/15/2020  Lipid Panel    Only the first 5 history entries have been loaded, but more history exists.          DXA SCAN (Every 2 Years) Next due on 7/28/2024 07/28/2022  DEXA Bone Density Axial    03/04/2020  DEXA Bone Density Axial    10/18/2017  DEXA Bone Density Axial    10/01/2017  SCANNED - DEXA    09/12/2017  Done    Only the first 5 history entries have been loaded, but more history exists.          MAMMOGRAM (Every 2 Years) Next due on 8/29/2024 08/29/2022  Mammo Diagnostic Digital Tomosynthesis Bilateral With CAD    05/25/2021  Mammo Diagnostic Digital Tomosynthesis Bilateral With CAD    05/05/2020  Mammo Diagnostic Digital Tomosynthesis Bilateral With CAD    06/05/2019  Mammo Screening Digital Tomosynthesis Bilateral With CAD    06/04/2018  Mammo Screening Digital Tomosynthesis Bilateral With CAD    Only the first 5 history entries have been loaded, but more history exists.          TDAP/TD VACCINES (3 - Td or Tdap) Next due on 1/25/2033 01/25/2023  Imm Admin: Tdap    01/01/2012  Done          HEPATITIS C SCREENING  Completed    09/12/2017  Hepatitis C Antibody    09/12/2017  Done          ZOSTER VACCINE (Series Information) Completed    11/11/2020  Imm Admin: Shingrix    07/16/2020  Imm Admin: Shingrix    01/01/2015  Done          COVID-19 Vaccine (Series Information) Completed    09/26/2022  Imm Admin: COVID-19 (PFIZER) BIVALENT BOOSTER 12+YRS    09/28/2021  Imm Admin: COVID-19 (PFIZER) PURPLE CAP    02/08/2021  Imm Admin: COVID-19 (PFIZER) PURPLE CAP    01/18/2021  Imm Admin: COVID-19 (PFIZER) PURPLE CAP          INFLUENZA VACCINE  Completed    09/26/2022  Imm Admin: Fluzone High-Dose 65+yrs    08/30/2021  Imm Admin: Fluzone High-Dose 65+yrs    08/30/2021  Imm Admin:  "Influenza, Unspecified    10/09/2020  Imm Admin: Fluzone High Dose =>65 Years ([employer] ONLY)    10/09/2020  Imm Admin: Influenza, Unspecified    Only the first 5 history entries have been loaded, but more history exists.          Discontinued - PAP SMEAR  Discontinued    01/25/2023  Frequency changed to Never by Shilpa Garrison APRN (Hysterectomy)              Immunization History   Administered Date(s) Administered   • COVID-19 (PFIZER) BIVALENT BOOSTER 12+YRS 09/26/2022   • COVID-19 (PFIZER) PURPLE CAP 01/18/2021, 02/08/2021, 09/28/2021   • Fluzone High Dose =>65 Years ([employer] ONLY) 09/15/2016, 09/12/2017, 10/27/2019, 10/09/2020   • Fluzone High-Dose 65+yrs 10/09/2020, 08/30/2021, 09/26/2022   • Hepatitis A 08/04/2019   • Influenza, Unspecified 10/28/2018, 10/27/2019, 10/09/2020, 08/30/2021   • Pneumococcal Polysaccharide (PPSV23) 09/12/2017   • Shingrix 07/16/2020, 11/11/2020   • Tdap 01/25/2023       Objective     Vital Signs  /80   Pulse 79   Temp 97 °F (36.1 °C)   Ht 162.6 cm (64\")   Wt 60.8 kg (134 lb)   SpO2 98%   BMI 23.00 kg/m²   Estimated body mass index is 23 kg/m² as calculated from the following:    Height as of this encounter: 162.6 cm (64\").    Weight as of this encounter: 60.8 kg (134 lb).    BMI is within normal parameters. No other follow-up for BMI required.      Physical Exam  Constitutional:       Appearance: Normal appearance. She is normal weight.   HENT:      Head: Normocephalic and atraumatic.      Nose: Nose normal.   Eyes:      Extraocular Movements: Extraocular movements intact.      Conjunctiva/sclera: Conjunctivae normal.      Pupils: Pupils are equal, round, and reactive to light.      Comments: Glasses in place   Cardiovascular:      Rate and Rhythm: Normal rate and regular rhythm.      Heart sounds: Normal heart sounds.   Pulmonary:      Effort: Pulmonary effort is normal. No respiratory distress.      Breath sounds: Normal breath sounds. No wheezing or rhonchi. "   Musculoskeletal:         General: Normal range of motion.      Cervical back: Neck supple.   Skin:     General: Skin is warm and dry.   Neurological:      General: No focal deficit present.      Mental Status: She is alert and oriented to person, place, and time. Mental status is at baseline.   Psychiatric:         Mood and Affect: Mood normal.         Behavior: Behavior normal.         Thought Content: Thought content normal.         Judgment: Judgment normal.          Assessment and Plan     Diagnoses and all orders for this visit:    1. Encounter to establish care (Primary)    2. Medicare annual wellness visit, subsequent    3. Pure hypercholesterolemia    4. Acquired hypothyroidism    5. Osteopenia, unspecified location    6. Primary hyperparathyroidism (HCC)    7. Need for vaccination  -     Tdap Vaccine Greater Than or Equal To 6yo IM        Follow Up  Return in about 1 year (around 1/25/2024) for Medicare Wellness.    AKREY Foote    Part of this note may be an electronic transcription/translation of spoken language to printed text using the Dragon Dictation System.

## 2023-01-25 NOTE — PROGRESS NOTES
The ABCs of the Annual Wellness Visit  Subsequent Medicare Wellness Visit    Chief Complaint   Patient presents with   • Establish Care   • Hypothyroidism      Subjective    History of Present Illness:  Beverley Yoo is a 72 y.o. female who presents for a Subsequent Medicare Wellness Visit.      The following portions of the patient's history were reviewed and   updated as appropriate: allergies, current medications, past family history, past medical history, past social history, past surgical history, and problem list.    Compared to one year ago, the patient feels her physical   health is the same.    Compared to one year ago, the patient feels her mental   health is the same.    Recent Hospitalizations:  She was not admitted to the hospital during the last year.       Current Medical Providers:  Patient Care Team:  Shilpa Garrison APRN as PCP - General (Internal Medicine)    Outpatient Medications Prior to Visit   Medication Sig Dispense Refill   • atorvastatin (LIPITOR) 10 MG tablet Take 1 tablet by mouth Daily. 90 tablet 1   • Calcium-Magnesium-Vitamin D (CALCIUM 500 PO) Take  by mouth 2 (Two) Times a Day.     • estradiol (VAGIFEM) 10 MCG tablet vaginal tablet INSERT 1 TABLET VAGINALLY 2 times per week 45 tablet 1   • Fish Oil-Cholecalciferol (OMEGA-3 + D PO) Take  by mouth 2 (Two) Times a Day.     • FLUZONE HIGH-DOSE 0.5 ML suspension prefilled syringe injection   0   • Multiple Vitamins-Minerals (MULTIVITAMIN ADULTS PO) Take  by mouth.     • Synthroid 100 MCG tablet Take 1 tablet by mouth Daily. 90 tablet 1   • tretinoin (RETIN-A) 0.05 % cream ANALIA AA HS  11     No facility-administered medications prior to visit.       No opioid medication identified on active medication list. I have reviewed chart for other potential  high risk medication/s and harmful drug interactions in the elderly.          Aspirin is not on active medication list.  Not indicated..    Patient Active Problem List   Diagnosis   •  "Fibrocystic breast disease   • Hypercalcemia   • Hyperlipidemia   • Hypothyroidism   • Mammogram abnormal   • Menopausal symptoms   • Osteopenia   • Seasonal allergies   • Vitamin D deficiency   • H/O colonoscopy   • Hypocalcemia   • Primary hyperparathyroidism (HCC)   • Acquired hallux rigidus of left foot   • Plantar fascial fibromatosis of right foot   • PAC (premature atrial contraction)   • Urinary, incontinence, stress female   • Pyriformis syndrome, unspecified laterality   • Asymptomatic postmenopausal state   • Left breast lump   • Family history of glaucoma   • Hypermetropia   • Posterior vitreous detachment   • Presbyopia   • Regular astigmatism   • Right buttock pain     Advance Care Planning  Advance Directive is on file.  ACP discussion was held with the patient during this visit. Patient has an advance directive in EMR which is still valid.           Objective    Vitals:    01/25/23 1500   BP: 150/80   Pulse: 79   Temp: 97 °F (36.1 °C)   SpO2: 98%   Weight: 60.8 kg (134 lb)   Height: 162.6 cm (64\")   PainSc: 0-No pain     Estimated body mass index is 23 kg/m² as calculated from the following:    Height as of this encounter: 162.6 cm (64\").    Weight as of this encounter: 60.8 kg (134 lb).    BMI is within normal parameters. No other follow-up for BMI required.      Does the patient have evidence of cognitive impairment? No    Physical Exam  Constitutional:       Appearance: Normal appearance. She is normal weight.   HENT:      Head: Normocephalic and atraumatic.      Nose: Nose normal.   Eyes:      Extraocular Movements: Extraocular movements intact.      Conjunctiva/sclera: Conjunctivae normal.      Pupils: Pupils are equal, round, and reactive to light.      Comments: Glasses in place   Cardiovascular:      Rate and Rhythm: Normal rate and regular rhythm.      Heart sounds: Normal heart sounds.   Pulmonary:      Effort: Pulmonary effort is normal. No respiratory distress.      Breath sounds: Normal " breath sounds. No wheezing or rhonchi.   Musculoskeletal:         General: Normal range of motion.      Cervical back: Neck supple.   Skin:     General: Skin is warm and dry.   Neurological:      General: No focal deficit present.      Mental Status: She is alert and oriented to person, place, and time. Mental status is at baseline.   Psychiatric:         Mood and Affect: Mood normal.         Behavior: Behavior normal.         Thought Content: Thought content normal.         Judgment: Judgment normal.       Lab Results   Component Value Date    TRIG 116 01/09/2023    HDL 97 (H) 01/09/2023     (H) 01/09/2023    VLDL 20 01/09/2023            HEALTH RISK ASSESSMENT    Smoking Status:  Social History     Tobacco Use   Smoking Status Never   Smokeless Tobacco Never     Alcohol Consumption:  Social History     Substance and Sexual Activity   Alcohol Use Yes   • Alcohol/week: 3.0 standard drinks   • Types: 3 Glasses of wine per week    Comment: 1 glass of wine 5 days per week     Fall Risk Screen:    COREY Fall Risk Assessment was completed, and patient is at LOW risk for falls.Assessment completed on:1/25/2023    Depression Screening:  PHQ-2/PHQ-9 Depression Screening 1/25/2023   Retired Total Score -   Little Interest or Pleasure in Doing Things 0-->not at all   Feeling Down, Depressed or Hopeless 0-->not at all   PHQ-9: Brief Depression Severity Measure Score 0       Health Habits and Functional and Cognitive Screening:  Functional & Cognitive Status 1/25/2023   Do you have difficulty preparing food and eating? No   Do you have difficulty bathing yourself, getting dressed or grooming yourself? No   Do you have difficulty using the toilet? No   Do you have difficulty moving around from place to place? No   Do you have trouble with steps or getting out of a bed or a chair? No   Current Diet Well Balanced Diet   Dental Exam Up to date   Eye Exam Up to date   Exercise (times per week) 6 times per week   Current  Exercises Include Walking;Pilates   Do you need help using the phone?  No   Are you deaf or do you have serious difficulty hearing?  No   Do you need help with transportation? No   Do you need help shopping? No   Do you need help preparing meals?  No   Do you need help with housework?  No   Do you need help with laundry? No   Do you need help taking your medications? No   Do you need help managing money? No   Do you ever drive or ride in a car without wearing a seat belt? No   Have you felt unusual stress, anger or loneliness in the last month? No   Who do you live with? Spouse   If you need help, do you have trouble finding someone available to you? No   Have you been bothered in the last four weeks by sexual problems? No   Do you have difficulty concentrating, remembering or making decisions? No       Age-appropriate Screening Schedule:  Refer to the list below for future screening recommendations based on patient's age, sex and/or medical conditions. Orders for these recommended tests are listed in the plan section. The patient has been provided with a written plan.    Health Maintenance   Topic Date Due   • TDAP/TD VACCINES (2 - Td or Tdap) 01/01/2022   • LIPID PANEL  01/09/2024   • DXA SCAN  07/28/2024   • MAMMOGRAM  08/29/2024   • INFLUENZA VACCINE  Completed   • ZOSTER VACCINE  Completed   • PAP SMEAR  Discontinued              Assessment & Plan   CMS Preventative Services Quick Reference  Risk Factors Identified During Encounter  None Identified  The above risks/problems have been discussed with the patient.  Follow up actions/plans if indicated are seen below in the Assessment/Plan Section.  Pertinent information has been shared with the patient in the After Visit Summary.    Diagnoses and all orders for this visit:    1. Need for vaccination (Primary)  -     Tdap Vaccine Greater Than or Equal To 6yo IM        Follow Up:   Return in about 1 year (around 1/25/2024) for Medicare Wellness.     An After Visit  Summary and PPPS were made available to the patient.         Part of this note may be an electronic transcription/translation of spoken language to printed text using the Dragon Dictation System.

## 2023-03-15 NOTE — ASSESSMENT & PLAN NOTE
Call from Miladys Romo with [employer]. Re-faxed hospital bed and mattress order.   Gudelia Guzman RN     Update flp   Taking low fat diet

## 2023-08-01 ENCOUNTER — TRANSCRIBE ORDERS (OUTPATIENT)
Dept: ADMINISTRATIVE | Facility: HOSPITAL | Age: 73
End: 2023-08-01
Payer: MEDICARE

## 2023-08-01 DIAGNOSIS — Z12.31 VISIT FOR SCREENING MAMMOGRAM: Primary | ICD-10-CM

## 2023-09-06 ENCOUNTER — HOSPITAL ENCOUNTER (OUTPATIENT)
Dept: MAMMOGRAPHY | Facility: HOSPITAL | Age: 73
Discharge: HOME | End: 2023-09-06
Admitting: INTERNAL MEDICINE
Payer: MEDICARE

## 2023-09-06 DIAGNOSIS — Z12.31 VISIT FOR SCREENING MAMMOGRAM: ICD-10-CM

## 2023-09-06 PROCEDURE — 77067 SCR MAMMO BI INCL CAD: CPT

## 2023-09-06 PROCEDURE — 77063 BREAST TOMOSYNTHESIS BI: CPT

## 2023-09-18 NOTE — ASSESSMENT & PLAN NOTE
No lesion on mamm corresponding to abnormality but dense breasts     Instructions: This plan will send the code FBSE to the PM system.  DO NOT or CHANGE the price. Detail Level: Simple Price (Do Not Change): 0.00

## 2023-10-27 ENCOUNTER — OFFICE VISIT (OUTPATIENT)
Dept: INTERNAL MEDICINE | Facility: CLINIC | Age: 73
End: 2023-10-27
Payer: MEDICARE

## 2023-10-27 VITALS
TEMPERATURE: 98.7 F | HEART RATE: 77 BPM | SYSTOLIC BLOOD PRESSURE: 144 MMHG | WEIGHT: 139 LBS | BODY MASS INDEX: 23.73 KG/M2 | HEIGHT: 64 IN | DIASTOLIC BLOOD PRESSURE: 78 MMHG | OXYGEN SATURATION: 98 %

## 2023-10-27 DIAGNOSIS — L30.9 DERMATITIS: Primary | ICD-10-CM

## 2023-10-27 DIAGNOSIS — B36.9 FUNGAL SKIN INFECTION: ICD-10-CM

## 2023-10-27 PROCEDURE — 99213 OFFICE O/P EST LOW 20 MIN: CPT | Performed by: NURSE PRACTITIONER

## 2023-10-27 PROCEDURE — 1159F MED LIST DOCD IN RCRD: CPT | Performed by: NURSE PRACTITIONER

## 2023-10-27 PROCEDURE — 1160F RVW MEDS BY RX/DR IN RCRD: CPT | Performed by: NURSE PRACTITIONER

## 2023-10-27 RX ORDER — DIAPER,BRIEF,INFANT-TODD,DISP
1 EACH MISCELLANEOUS 2 TIMES DAILY
Qty: 56 G | Refills: 0 | Status: SHIPPED | OUTPATIENT
Start: 2023-10-27

## 2023-10-27 RX ORDER — VALACYCLOVIR HYDROCHLORIDE 500 MG/1
1 TABLET, FILM COATED ORAL DAILY
COMMUNITY
Start: 2023-08-29 | End: 2023-10-27

## 2023-10-27 RX ORDER — KETOCONAZOLE 20 MG/G
1 CREAM TOPICAL 2 TIMES DAILY
Qty: 60 G | Refills: 1 | Status: SHIPPED | OUTPATIENT
Start: 2023-10-27

## 2023-10-27 NOTE — PROGRESS NOTES
Office Note     Name: Beverley Yoo    : 1950     MRN: [id number]     Chief Complaint  Rash    Subjective     History of Present Illness:  Beverley Yoo is a 73 y.o. female who presents today for concerns related to rashes in 2 different locations    The first rash is located on the left side of her face along her jawline.  She describes it as itching.  It started last night.  It was bigger this morning versus last night.  She denies any changes to soaps detergents or lotions.  She recently went to the [employer] to hike so is unsure if this could be related.  This is also near where the edge of her hairline hits    Patient also has a rash on the second digit of her left foot.  She thinks she may have athlete's foot.  She has tried over-the-counter creams which have helped slightly.  Again she did go to the [employer] recently.  She describes it as itchy and flaky.    Patient did let me know she recently had her flu shot and COVID booster            Past Medical History:   Diagnosis Date    Abnormal ECG 2018    Arrhythmia 2018    PVCs    Arthritis of back     ? Please look at xray & see if arthritis.    Bursitis of hip 2019    Encounter for screening colonoscopy     diverticulosis- Gem  repeat 10 years    Encounter for screening for osteoporosis     epeat dexa  Last Impression: 2014  dexa  low bone density  Nela Kenoyn    Fracture of ankle ?    Fracture, clavicle     As an elementary school age child    Hyperlipidemia ~    Hypothyroidism ~    Osteopenia     Periarthritis of shoulder     Plantar fascial fibromatosis of right foot     Primary hyperparathyroidism        Past Surgical History:   Procedure Laterality Date    BREAST BIOPSY Bilateral     Stereo - benign    BREAST BIOPSY Bilateral     benign    BREAST EXCISIONAL BIOPSY Right     benign    DENTAL PROCEDURE      HISTORY OF DENTAL SURGERY     HYSTERECTOMY  ~    OOPHORECTOMY Bilateral ~    TRIGGER  "POINT INJECTION  1987       Social History     Socioeconomic History    Marital status:    Tobacco Use    Smoking status: Never    Smokeless tobacco: Never   Vaping Use    Vaping Use: Never used   Substance and Sexual Activity    Alcohol use: Yes     Alcohol/week: 3.0 standard drinks of alcohol     Types: 3 Glasses of wine per week     Comment: 1 glass of wine 5 days per week    Drug use: No    Sexual activity: Yes     Partners: Male     Birth control/protection: Surgical, Post-menopausal         Current Outpatient Medications:     atorvastatin (LIPITOR) 10 MG tablet, Take 1 tablet by mouth Daily., Disp: 90 tablet, Rfl: 1    Calcium-Magnesium-Vitamin D (CALCIUM 500 PO), Take  by mouth 2 (Two) Times a Day., Disp: , Rfl:     estradiol (VAGIFEM) 10 MCG tablet vaginal tablet, INSERT 1 TABLET VAGINALLY 2 times per week, Disp: 45 tablet, Rfl: 1    Fish Oil-Cholecalciferol (OMEGA-3 + D PO), Take  by mouth 2 (Two) Times a Day., Disp: , Rfl:     levothyroxine (Synthroid) 100 MCG tablet, Take 1 tablet by mouth Daily. Please dispense brand synthroid, Disp: 90 tablet, Rfl: 1    Multiple Vitamins-Minerals (MULTIVITAMIN ADULTS PO), Take  by mouth., Disp: , Rfl:     tretinoin (RETIN-A) 0.05 % cream, ANALIA AA HS, Disp: , Rfl: 11    hydrocortisone 0.5 % cream, Apply 1 application  topically to the appropriate area as directed 2 (Two) Times a Day., Disp: 56 g, Rfl: 0    ketoconazole (NIZORAL) 2 % cream, Apply 1 application  topically to the appropriate area as directed 2 (Two) Times a Day., Disp: 60 g, Rfl: 1    Objective     Vital Signs  /78   Pulse 77   Temp 98.7 °F (37.1 °C)   Ht 162.6 cm (64\")   Wt 63 kg (139 lb)   SpO2 98%   BMI 23.86 kg/m²   Estimated body mass index is 23.86 kg/m² as calculated from the following:    Height as of this encounter: 162.6 cm (64\").    Weight as of this encounter: 63 kg (139 lb).    BMI is within normal parameters. No other follow-up for BMI required.         Physical Exam  Vitals " and nursing note reviewed.   Constitutional:       Appearance: Normal appearance.   HENT:      Head: Normocephalic and atraumatic.   Eyes:      Extraocular Movements: Extraocular movements intact.      Pupils: Pupils are equal, round, and reactive to light.   Cardiovascular:      Rate and Rhythm: Normal rate and regular rhythm.   Pulmonary:      Effort: Pulmonary effort is normal.   Musculoskeletal:         General: Normal range of motion.   Skin:     General: Skin is warm and dry.      Findings: Rash present.      Comments: Rash on face: Area was about 2 inches by half an inch.  Redness and excoriation noted.  No drainage or open areas.  Rash noted to the left foot second digit that was flaky and erythematous.  Excoriation noted.  No open areas or drainage.  Consistent with fungal skin illness   Neurological:      Mental Status: She is alert and oriented to person, place, and time.   Psychiatric:         Mood and Affect: Mood normal.         Behavior: Behavior normal.                 Assessment and Plan     Diagnoses and all orders for this visit:    1. Dermatitis (Primary)  -     hydrocortisone 0.5 % cream; Apply 1 application  topically to the appropriate area as directed 2 (Two) Times a Day.  Dispense: 56 g; Refill: 0    2. Fungal skin infection  -     ketoconazole (NIZORAL) 2 % cream; Apply 1 application  topically to the appropriate area as directed 2 (Two) Times a Day.  Dispense: 60 g; Refill: 1    Plan  Discussed with patient that I will send in a low-dose hydrocortisone cream to use on the face.  She will use it twice a day for 7 days only in the area that is indicated.    Ketoconazole cream was sent to the pharmacy for the left second digit.  She will apply twice daily.  We did discuss that it can take some time for fungal infections to heal.  Continue to keep the area clean and dry.  Keep an extra pair shoes and socks with you just in case there is any increase in sweat or irritation.    Thank you for the  update about your COVID and flu immunization    Go to ER if any condition worsens or severe    Plan to follow-up as scheduled with Shilpa    Follow Up  Return for Next scheduled follow up.    KAREY Hardy    Part of this note may be an electronic transcription/translation of spoken language to printed text using the Dragon Dictation System.

## 2024-01-15 ENCOUNTER — OFFICE VISIT (OUTPATIENT)
Dept: ENDOCRINOLOGY | Facility: CLINIC | Age: 74
End: 2024-01-15
Payer: MEDICARE

## 2024-01-15 VITALS
HEART RATE: 68 BPM | SYSTOLIC BLOOD PRESSURE: 138 MMHG | DIASTOLIC BLOOD PRESSURE: 74 MMHG | WEIGHT: 139 LBS | BODY MASS INDEX: 23.73 KG/M2 | HEIGHT: 64 IN

## 2024-01-15 DIAGNOSIS — E03.9 ACQUIRED HYPOTHYROIDISM: Primary | ICD-10-CM

## 2024-01-15 DIAGNOSIS — E78.00 PURE HYPERCHOLESTEROLEMIA: ICD-10-CM

## 2024-01-15 LAB
ALBUMIN SERPL-MCNC: 4.5 G/DL (ref 3.5–5.2)
ALBUMIN/GLOB SERPL: 2 G/DL
ALP SERPL-CCNC: 124 U/L (ref 39–117)
ALT SERPL W P-5'-P-CCNC: 28 U/L (ref 1–33)
ANION GAP SERPL CALCULATED.3IONS-SCNC: 9.6 MMOL/L (ref 5–15)
AST SERPL-CCNC: 28 U/L (ref 1–32)
BILIRUB SERPL-MCNC: 0.5 MG/DL (ref 0–1.2)
BUN SERPL-MCNC: 15 MG/DL (ref 8–23)
BUN/CREAT SERPL: 17.2 (ref 7–25)
CALCIUM SPEC-SCNC: 10.4 MG/DL (ref 8.6–10.5)
CHLORIDE SERPL-SCNC: 105 MMOL/L (ref 98–107)
CHOLEST SERPL-MCNC: 223 MG/DL (ref 0–200)
CO2 SERPL-SCNC: 26.4 MMOL/L (ref 22–29)
CREAT SERPL-MCNC: 0.87 MG/DL (ref 0.57–1)
EGFRCR SERPLBLD CKD-EPI 2021: 70.9 ML/MIN/1.73
GLOBULIN UR ELPH-MCNC: 2.2 GM/DL
GLUCOSE SERPL-MCNC: 92 MG/DL (ref 65–99)
HDLC SERPL-MCNC: 87 MG/DL (ref 40–60)
LDLC SERPL CALC-MCNC: 112 MG/DL (ref 0–100)
LDLC/HDLC SERPL: 1.24 {RATIO}
POTASSIUM SERPL-SCNC: 4.8 MMOL/L (ref 3.5–5.2)
PROT SERPL-MCNC: 6.7 G/DL (ref 6–8.5)
SODIUM SERPL-SCNC: 141 MMOL/L (ref 136–145)
T4 FREE SERPL-MCNC: 1.27 NG/DL (ref 0.93–1.7)
TRIGL SERPL-MCNC: 140 MG/DL (ref 0–150)
TSH SERPL DL<=0.05 MIU/L-ACNC: 0.26 UIU/ML (ref 0.27–4.2)
VLDLC SERPL-MCNC: 24 MG/DL (ref 5–40)

## 2024-01-15 PROCEDURE — 84439 ASSAY OF FREE THYROXINE: CPT | Performed by: INTERNAL MEDICINE

## 2024-01-15 PROCEDURE — 1160F RVW MEDS BY RX/DR IN RCRD: CPT | Performed by: INTERNAL MEDICINE

## 2024-01-15 PROCEDURE — 36415 COLL VENOUS BLD VENIPUNCTURE: CPT | Performed by: INTERNAL MEDICINE

## 2024-01-15 PROCEDURE — 84443 ASSAY THYROID STIM HORMONE: CPT | Performed by: INTERNAL MEDICINE

## 2024-01-15 PROCEDURE — 99214 OFFICE O/P EST MOD 30 MIN: CPT | Performed by: INTERNAL MEDICINE

## 2024-01-15 PROCEDURE — 80053 COMPREHEN METABOLIC PANEL: CPT | Performed by: INTERNAL MEDICINE

## 2024-01-15 PROCEDURE — 1159F MED LIST DOCD IN RCRD: CPT | Performed by: INTERNAL MEDICINE

## 2024-01-15 PROCEDURE — 80061 LIPID PANEL: CPT | Performed by: INTERNAL MEDICINE

## 2024-01-15 RX ORDER — VALACYCLOVIR HYDROCHLORIDE 500 MG/1
1 TABLET, FILM COATED ORAL DAILY
COMMUNITY
Start: 2024-01-02

## 2024-01-15 RX ORDER — LEVOTHYROXINE SODIUM 100 MCG
100 TABLET ORAL DAILY
Qty: 90 TABLET | Refills: 1 | Status: SHIPPED | OUTPATIENT
Start: 2024-01-15 | End: 2024-01-16

## 2024-01-15 NOTE — PROGRESS NOTES
Beverley Yoo 73 y.o.  CC: Hypothyroidism and Hyperlipidemia      [place]: follow up Hypothyroidism and Hyperlipidemia    Taking synthroid 100 mcg daily   Taking lipitor 10 mg daily   Bp is good today   Is exercising 5 days a week     No Known Allergies    Current Outpatient Medications:     atorvastatin (LIPITOR) 10 MG tablet, Take 1 tablet by mouth Daily., Disp: 90 tablet, Rfl: 1    Calcium-Magnesium-Vitamin D (CALCIUM 500 PO), Take  by mouth 2 (Two) Times a Day., Disp: , Rfl:     estradiol (VAGIFEM) 10 MCG tablet vaginal tablet, INSERT 1 TABLET VAGINALLY 2 times per week, Disp: 45 tablet, Rfl: 1    Fish Oil-Cholecalciferol (OMEGA-3 + D PO), Take  by mouth 2 (Two) Times a Day., Disp: , Rfl:     Multiple Vitamins-Minerals (MULTIVITAMIN ADULTS PO), Take  by mouth., Disp: , Rfl:     Synthroid 100 MCG tablet, Take 1 tablet by mouth Daily. Please dispense brand synthroid, Disp: 90 tablet, Rfl: 1    tretinoin (RETIN-A) 0.05 % cream, ANALIA AA HS, Disp: , Rfl: 11    valACYclovir (VALTREX) 500 MG tablet, Take 1 tablet by mouth Daily., Disp: , Rfl:   Patient Active Problem List    Diagnosis     Right buttock pain [M79.18]     Family history of glaucoma [Z83.511]     Hypermetropia [H52.00]     Posterior vitreous detachment [H43.819]     Presbyopia [H52.4]     Regular astigmatism [H52.229]     Left breast lump [N63.20]     Asymptomatic postmenopausal state [Z78.0]     Urinary, incontinence, stress female [N39.3]     Pyriformis syndrome, unspecified laterality [G57.00]     PAC (premature atrial contraction) [I49.1]     Acquired hallux rigidus of left foot [M20.22]     Plantar fascial fibromatosis of right foot [M72.2]     Primary hyperparathyroidism [E21.0]     H/O colonoscopy [Z98.890]     Hypocalcemia [E83.51]     Menopausal symptoms [N95.1]     Osteopenia [M85.80]     Seasonal allergies [J30.2]     Vitamin D deficiency [E55.9]     Fibrocystic breast disease [N60.19]     Hypercalcemia [E83.52]     Hyperlipidemia [E78.5]      Hypothyroidism [E03.9]     Mammogram abnormal [R92.8]      Review of Systems   Constitutional:  Negative for activity change, appetite change and unexpected weight change.   HENT:  Negative for congestion and rhinorrhea.    Eyes:  Negative for visual disturbance.   Respiratory:  Negative for cough and shortness of breath.    Cardiovascular:  Negative for palpitations and leg swelling.   Gastrointestinal:  Negative for constipation, diarrhea and nausea.   Genitourinary:  Negative for hematuria.   Musculoskeletal:  Negative for arthralgias, back pain, gait problem, joint swelling and myalgias.   Skin:  Negative for color change, rash and wound.   Allergic/Immunologic: Negative for environmental allergies, food allergies and immunocompromised state.   Neurological:  Negative for dizziness, weakness and light-headedness.   Psychiatric/Behavioral:  Negative for confusion, decreased concentration, dysphoric mood and sleep disturbance. The patient is not nervous/anxious.        Social History     Socioeconomic History    Marital status:    Tobacco Use    Smoking status: Never    Smokeless tobacco: Never   Vaping Use    Vaping Use: Never used   Substance and Sexual Activity    Alcohol use: Yes     Alcohol/week: 3.0 standard drinks of alcohol     Types: 3 Glasses of wine per week     Comment: 1 glass of wine 5 days per week    Drug use: No    Sexual activity: Yes     Partners: Male     Birth control/protection: Surgical, Post-menopausal     Family History   Problem Relation Age of Onset    Cancer Paternal Grandfather         BREAST     Breast cancer Mother 60    Cancer Mother         Breast CA    Thyroid disease Mother     Breast cancer Sister 60    Breast cancer Maternal Grandmother 60    Cancer Maternal Grandmother         Breast CA    Cancer Father     Anemia Father     Hyperlipidemia Father     Cancer Sister         Breast Cancer    Ovarian cancer Neg Hx     Endometrial cancer Neg Hx      /74   Pulse 68    "Ht 162.6 cm (64.02\")   Wt 63 kg (139 lb)   BMI 23.85 kg/m²     Physical Exam  Vitals and nursing note reviewed.   Constitutional:       Appearance: Normal appearance. She is well-developed.   HENT:      Head: Normocephalic and atraumatic.   Eyes:      General: Lids are normal.      Extraocular Movements: Extraocular movements intact.      Conjunctiva/sclera: Conjunctivae normal.      Pupils: Pupils are equal, round, and reactive to light.   Neck:      Thyroid: No thyroid mass or thyromegaly.      Vascular: No carotid bruit.      Trachea: Trachea normal. No tracheal deviation.   Cardiovascular:      Rate and Rhythm: Normal rate and regular rhythm.      Heart sounds: Normal heart sounds. No murmur heard.     No friction rub. No gallop.   Pulmonary:      Effort: Pulmonary effort is normal. No respiratory distress.      Breath sounds: Normal breath sounds. No wheezing.   Musculoskeletal:         General: No deformity. Normal range of motion.      Cervical back: Normal range of motion and neck supple.   Lymphadenopathy:      Cervical: No cervical adenopathy.   Skin:     General: Skin is warm and dry.      Findings: No erythema or rash.      Nails: There is no clubbing.   Neurological:      Mental Status: She is alert and oriented to person, place, and time.      Cranial Nerves: No cranial nerve deficit.      Deep Tendon Reflexes: Reflexes are normal and symmetric. Reflexes normal.   Psychiatric:         Speech: Speech normal.         Behavior: Behavior normal.         Thought Content: Thought content normal.         Judgment: Judgment normal.       Results for orders placed or performed in visit on 07/13/23   Comprehensive Metabolic Panel    Specimen: Blood   Result Value Ref Range    Glucose 86 65 - 99 mg/dL    BUN 13 8 - 23 mg/dL    Creatinine 0.88 0.57 - 1.00 mg/dL    Sodium 143 136 - 145 mmol/L    Potassium 4.0 3.5 - 5.2 mmol/L    Chloride 107 98 - 107 mmol/L    CO2 26.0 22.0 - 29.0 mmol/L    Calcium 10.0 8.6 - " 10.5 mg/dL    Total Protein 6.9 6.0 - 8.5 g/dL    Albumin 4.6 3.5 - 5.2 g/dL    ALT (SGPT) 24 1 - 33 U/L    AST (SGOT) 31 1 - 32 U/L    Alkaline Phosphatase 101 39 - 117 U/L    Total Bilirubin 0.6 0.0 - 1.2 mg/dL    Globulin 2.3 gm/dL    A/G Ratio 2.0 g/dL    BUN/Creatinine Ratio 14.8 7.0 - 25.0    Anion Gap 10.0 5.0 - 15.0 mmol/L    eGFR 70.4 >60.0 mL/min/1.73   CBC Auto Differential    Specimen: Blood   Result Value Ref Range    WBC 4.80 3.40 - 10.80 10*3/mm3    RBC 4.52 3.77 - 5.28 10*6/mm3    Hemoglobin 14.3 12.0 - 15.9 g/dL    Hematocrit 41.7 34.0 - 46.6 %    MCV 92.3 79.0 - 97.0 fL    MCH 31.6 26.6 - 33.0 pg    MCHC 34.3 31.5 - 35.7 g/dL    RDW 12.1 (L) 12.3 - 15.4 %    RDW-SD 40.5 37.0 - 54.0 fl    MPV 10.6 6.0 - 12.0 fL    Platelets 223 140 - 450 10*3/mm3    Neutrophil % 48.3 42.7 - 76.0 %    Lymphocyte % 33.8 19.6 - 45.3 %    Monocyte % 14.8 (H) 5.0 - 12.0 %    Eosinophil % 1.9 0.3 - 6.2 %    Basophil % 1.0 0.0 - 1.5 %    Immature Grans % 0.2 0.0 - 0.5 %    Neutrophils, Absolute 2.32 1.70 - 7.00 10*3/mm3    Lymphocytes, Absolute 1.62 0.70 - 3.10 10*3/mm3    Monocytes, Absolute 0.71 0.10 - 0.90 10*3/mm3    Eosinophils, Absolute 0.09 0.00 - 0.40 10*3/mm3    Basophils, Absolute 0.05 0.00 - 0.20 10*3/mm3    Immature Grans, Absolute 0.01 0.00 - 0.05 10*3/mm3    nRBC 0.0 0.0 - 0.2 /100 WBC   Lipid Panel    Specimen: Blood   Result Value Ref Range    Total Cholesterol 215 (H) 0 - 200 mg/dL    Triglycerides 78 0 - 150 mg/dL    HDL Cholesterol 91 (H) 40 - 60 mg/dL    LDL Cholesterol  110 (H) 0 - 100 mg/dL    VLDL Cholesterol 14 5 - 40 mg/dL    LDL/HDL Ratio 1.19    TSH    Specimen: Blood   Result Value Ref Range    TSH 0.376 0.270 - 4.200 uIU/mL   T4, Free    Specimen: Blood   Result Value Ref Range    Free T4 1.41 0.93 - 1.70 ng/dL   Vitamin D,25-Hydroxy    Specimen: Arm, Left; Blood   Result Value Ref Range    25 Hydroxy, Vitamin D 42.6 30.0 - 100.0 ng/ml     Diagnoses and all orders for this visit:    1. Acquired  hypothyroidism (Primary)  Assessment & Plan:  Continue synthroid (DNS) 100 mcg daily   Check tfts     Orders:  -     TSH; Future  -     T4, Free; Future  -     TSH  -     T4, Free    2. Pure hypercholesterolemia  Assessment & Plan:  Continue lipitor 10 mg daily   Check flp    Orders:  -     Comprehensive Metabolic Panel; Future  -     Lipid Panel; Future  -     Comprehensive Metabolic Panel  -     Lipid Panel    Other orders  -     Synthroid 100 MCG tablet; Take 1 tablet by mouth Daily. Please dispense brand synthroid  Dispense: 90 tablet; Refill: 1    Return in about 6 months (around 7/15/2024).    Nela Kenyon MD  Signed Nela Kenyon MD

## 2024-01-16 DIAGNOSIS — E03.9 ACQUIRED HYPOTHYROIDISM: Primary | ICD-10-CM

## 2024-01-16 RX ORDER — LEVOTHYROXINE SODIUM 88 MCG
88 TABLET ORAL DAILY
Qty: 90 TABLET | Refills: 3 | Status: SHIPPED | OUTPATIENT
Start: 2024-01-16 | End: 2025-01-15

## 2024-01-26 RX ORDER — ATORVASTATIN CALCIUM 10 MG/1
10 TABLET, FILM COATED ORAL DAILY
Qty: 90 TABLET | Refills: 1 | Status: SHIPPED | OUTPATIENT
Start: 2024-01-26

## 2024-01-26 NOTE — TELEPHONE ENCOUNTER
Rx Refill Note  Requested Prescriptions     Pending Prescriptions Disp Refills    atorvastatin (LIPITOR) 10 MG tablet [Pharmacy Med Name: ATORVASTATIN TABS 10MG] 90 tablet 3     Sig: TAKE 1 TABLET DAILY        Last office visit with prescribing clinician: 1/15/2024      Next office visit with prescribing clinician: 8/5/2024       Aggie Bentley (Jodi)  01/26/24, 10:41 EST

## 2024-08-05 ENCOUNTER — OFFICE VISIT (OUTPATIENT)
Dept: ENDOCRINOLOGY | Facility: CLINIC | Age: 74
End: 2024-08-05
Payer: MEDICARE

## 2024-08-05 VITALS
DIASTOLIC BLOOD PRESSURE: 80 MMHG | OXYGEN SATURATION: 94 % | HEIGHT: 64 IN | HEART RATE: 81 BPM | BODY MASS INDEX: 23.01 KG/M2 | SYSTOLIC BLOOD PRESSURE: 130 MMHG | WEIGHT: 134.8 LBS

## 2024-08-05 DIAGNOSIS — E55.9 VITAMIN D DEFICIENCY: ICD-10-CM

## 2024-08-05 DIAGNOSIS — E78.00 PURE HYPERCHOLESTEROLEMIA: Primary | ICD-10-CM

## 2024-08-05 DIAGNOSIS — E03.9 ACQUIRED HYPOTHYROIDISM: ICD-10-CM

## 2024-08-05 DIAGNOSIS — K21.00 GASTROESOPHAGEAL REFLUX DISEASE WITH ESOPHAGITIS WITHOUT HEMORRHAGE: ICD-10-CM

## 2024-08-05 PROBLEM — G43.109 OPHTHALMIC MIGRAINE: Status: ACTIVE | Noted: 2023-10-13

## 2024-08-05 PROBLEM — L82.1 SEBORRHEIC KERATOSIS: Status: ACTIVE | Noted: 2023-12-21

## 2024-08-05 PROBLEM — H25.13 AGE-RELATED NUCLEAR CATARACT OF BOTH EYES: Status: ACTIVE | Noted: 2023-10-13

## 2024-08-05 PROBLEM — D22.9 MULTIPLE BENIGN MELANOCYTIC NEVI: Status: ACTIVE | Noted: 2023-12-21

## 2024-08-05 PROBLEM — L81.4 SOLAR LENTIGO: Status: ACTIVE | Noted: 2023-12-21

## 2024-08-05 PROBLEM — I78.1 SENILE ANGIOMA: Status: ACTIVE | Noted: 2023-12-21

## 2024-08-05 LAB
25(OH)D3 SERPL-MCNC: 47 NG/ML (ref 30–100)
ALBUMIN SERPL-MCNC: 4.4 G/DL (ref 3.5–5.2)
ALBUMIN/GLOB SERPL: 1.8 G/DL
ALP SERPL-CCNC: 132 U/L (ref 39–117)
ALT SERPL W P-5'-P-CCNC: 22 U/L (ref 1–33)
ANION GAP SERPL CALCULATED.3IONS-SCNC: 11 MMOL/L (ref 5–15)
AST SERPL-CCNC: 25 U/L (ref 1–32)
BILIRUB SERPL-MCNC: 0.5 MG/DL (ref 0–1.2)
BUN SERPL-MCNC: 15 MG/DL (ref 8–23)
BUN/CREAT SERPL: 17.4 (ref 7–25)
CALCIUM SPEC-SCNC: 9.8 MG/DL (ref 8.6–10.5)
CHLORIDE SERPL-SCNC: 104 MMOL/L (ref 98–107)
CHOLEST SERPL-MCNC: 222 MG/DL (ref 0–200)
CO2 SERPL-SCNC: 27 MMOL/L (ref 22–29)
CREAT SERPL-MCNC: 0.86 MG/DL (ref 0.57–1)
EGFRCR SERPLBLD CKD-EPI 2021: 71.4 ML/MIN/1.73
GLOBULIN UR ELPH-MCNC: 2.4 GM/DL
GLUCOSE SERPL-MCNC: 87 MG/DL (ref 65–99)
HDLC SERPL-MCNC: 76 MG/DL (ref 40–60)
LDLC SERPL CALC-MCNC: 131 MG/DL (ref 0–100)
LDLC/HDLC SERPL: 1.69 {RATIO}
POTASSIUM SERPL-SCNC: 4.3 MMOL/L (ref 3.5–5.2)
PROT SERPL-MCNC: 6.8 G/DL (ref 6–8.5)
SODIUM SERPL-SCNC: 142 MMOL/L (ref 136–145)
T4 FREE SERPL-MCNC: 1.19 NG/DL (ref 0.92–1.68)
TRIGL SERPL-MCNC: 88 MG/DL (ref 0–150)
TSH SERPL DL<=0.05 MIU/L-ACNC: 1.08 UIU/ML (ref 0.27–4.2)
VLDLC SERPL-MCNC: 15 MG/DL (ref 5–40)

## 2024-08-05 PROCEDURE — 82306 VITAMIN D 25 HYDROXY: CPT | Performed by: INTERNAL MEDICINE

## 2024-08-05 PROCEDURE — 1160F RVW MEDS BY RX/DR IN RCRD: CPT | Performed by: INTERNAL MEDICINE

## 2024-08-05 PROCEDURE — 84439 ASSAY OF FREE THYROXINE: CPT | Performed by: INTERNAL MEDICINE

## 2024-08-05 PROCEDURE — 80053 COMPREHEN METABOLIC PANEL: CPT | Performed by: INTERNAL MEDICINE

## 2024-08-05 PROCEDURE — 80061 LIPID PANEL: CPT | Performed by: INTERNAL MEDICINE

## 2024-08-05 PROCEDURE — 84443 ASSAY THYROID STIM HORMONE: CPT | Performed by: INTERNAL MEDICINE

## 2024-08-05 PROCEDURE — 36415 COLL VENOUS BLD VENIPUNCTURE: CPT | Performed by: INTERNAL MEDICINE

## 2024-08-05 PROCEDURE — 99214 OFFICE O/P EST MOD 30 MIN: CPT | Performed by: INTERNAL MEDICINE

## 2024-08-05 PROCEDURE — 1159F MED LIST DOCD IN RCRD: CPT | Performed by: INTERNAL MEDICINE

## 2024-08-05 NOTE — PROGRESS NOTES
Beverley Yoo 74 y.o.  CC: follow up  Hypothyroidism and Hyperlipidemia      [place]: follow up Hypothyroidism and Hyperlipidemia    Taking synthroid 88 mcg daily   Taking lipitor 10 mg daily   Having gerd- new  Elevated hob and is taking prilosec    No Known Allergies    Current Outpatient Medications:     atorvastatin (LIPITOR) 10 MG tablet, TAKE 1 TABLET DAILY, Disp: 90 tablet, Rfl: 1    Calcium-Magnesium-Vitamin D (CALCIUM 500 PO), Take  by mouth 2 (Two) Times a Day., Disp: , Rfl:     estradiol (VAGIFEM) 10 MCG tablet vaginal tablet, INSERT 1 TABLET VAGINALLY 2 times per week, Disp: 45 tablet, Rfl: 1    Fish Oil-Cholecalciferol (OMEGA-3 + D PO), Take  by mouth 2 (Two) Times a Day., Disp: , Rfl:     Multiple Vitamins-Minerals (MULTIVITAMIN ADULTS PO), Take  by mouth., Disp: , Rfl:     Synthroid 88 MCG tablet, Take 1 tablet by mouth Daily., Disp: 90 tablet, Rfl: 3    tretinoin (RETIN-A) 0.05 % cream, ANALIA AA HS, Disp: , Rfl: 11    valACYclovir (VALTREX) 500 MG tablet, Take 1 tablet by mouth Daily., Disp: , Rfl:     Patient Active Problem List    Diagnosis     Gastroesophageal reflux disease with esophagitis without hemorrhage [K21.00]     Multiple benign melanocytic nevi [D22.9]     Senile angioma [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.1]     Seborrheic keratosis [L82.1]     Solar lentigo [L81.4]     Age-related nuclear cataract of both eyes [H25.13]     Ophthalmic migraine [G43.109]     Right buttock pain [M79.18]     Family history of glaucoma [Z83.511]     Hypermetropia [H52.00]     Posterior vitreous detachment [H43.819]     Presbyopia [H52.4]     Regular astigmatism [H52.229]     Left breast lump [N63.20]     Asymptomatic postmenopausal state [Z78.0]     Urinary, incontinence, stress female [N39.3]     Pyriformis syndrome, unspecified laterality [G57.00]     PAC (premature atrial contraction) [I49.1]     Acquired hallux rigidus of left foot [M20.22]     Plantar fascial fibromatosis of right foot [M72.2]     Primary hyperparathyroidism [E21.0]      H/O colonoscopy [Z98.890]     Hypocalcemia [E83.51]     Menopausal symptoms [N95.1]     Osteopenia [M85.80]     Seasonal allergies [J30.2]     Vitamin D deficiency [E55.9]     Fibrocystic breast disease [N60.19]     Hypercalcemia [E83.52]     Hyperlipidemia [E78.5]     Hypothyroidism [E03.9]     Mammogram abnormal [R92.8]      Review of Systems   Constitutional:  Negative for activity change, appetite change and unexpected weight change.   HENT:  Negative for congestion and rhinorrhea.    Eyes:  Negative for visual disturbance.   Respiratory:  Negative for cough and shortness of breath.    Cardiovascular:  Negative for palpitations and leg swelling.   Gastrointestinal:  Negative for constipation, diarrhea and nausea.   Genitourinary:  Negative for hematuria.   Musculoskeletal:  Negative for arthralgias, back pain, gait problem, joint swelling and myalgias.   Skin:  Negative for color change, rash and wound.   Allergic/Immunologic: Negative for environmental allergies, food allergies and immunocompromised state.   Neurological:  Negative for dizziness, weakness and light-headedness.   Psychiatric/Behavioral:  Negative for confusion, decreased concentration, dysphoric mood and sleep disturbance. The patient is not nervous/anxious.      Social History     Socioeconomic History    Marital status:    Tobacco Use    Smoking status: Never    Smokeless tobacco: Never   Vaping Use    Vaping status: Never Used   Substance and Sexual Activity    Alcohol use: Yes     Alcohol/week: 3.0 standard drinks of alcohol     Types: 3 Glasses of wine per week     Comment: 1 glass of wine 5 days per week    Drug use: No    Sexual activity: Yes     Partners: Male     Birth control/protection: Surgical, Post-menopausal     Family History   Problem Relation Age of Onset    Cancer Paternal Grandfather         BREAST     Breast cancer Mother 60    Cancer Mother         Breast CA    Thyroid disease Mother     Breast cancer Sister 60     "Breast cancer Maternal Grandmother 60    Cancer Maternal Grandmother         Breast CA    Cancer Father     Anemia Father     Hyperlipidemia Father     Cancer Sister         Breast Cancer    Ovarian cancer Neg Hx     Endometrial cancer Neg Hx      /80   Pulse 81   Ht 162.6 cm (64.02\")   Wt 61.1 kg (134 lb 12.8 oz)   SpO2 94%   BMI 23.13 kg/m²   Physical Exam  Vitals and nursing note reviewed.   Constitutional:       Appearance: Normal appearance. She is well-developed.   HENT:      Head: Normocephalic and atraumatic.   Eyes:      General: Lids are normal.      Extraocular Movements: Extraocular movements intact.      Conjunctiva/sclera: Conjunctivae normal.      Pupils: Pupils are equal, round, and reactive to light.   Neck:      Thyroid: No thyroid mass or thyromegaly.      Vascular: No carotid bruit.      Trachea: Trachea normal. No tracheal deviation.   Cardiovascular:      Rate and Rhythm: Normal rate and regular rhythm.      Pulses: Normal pulses.      Heart sounds: Normal heart sounds. No murmur heard.     No friction rub. No gallop.   Pulmonary:      Effort: Pulmonary effort is normal. No respiratory distress.      Breath sounds: Normal breath sounds. No wheezing.   Musculoskeletal:         General: No deformity. Normal range of motion.      Cervical back: Normal range of motion and neck supple.   Lymphadenopathy:      Cervical: No cervical adenopathy.   Skin:     General: Skin is warm and dry.      Findings: No erythema or rash.      Nails: There is no clubbing.   Neurological:      General: No focal deficit present.      Mental Status: She is alert and oriented to person, place, and time.      Cranial Nerves: No cranial nerve deficit.      Deep Tendon Reflexes: Reflexes are normal and symmetric. Reflexes normal.   Psychiatric:         Mood and Affect: Mood normal.         Speech: Speech normal.         Behavior: Behavior normal.         Thought Content: Thought content normal.         Judgment: " Judgment normal.       Results for orders placed or performed in visit on 01/15/24   Comprehensive Metabolic Panel    Specimen: Arm, Left; Blood   Result Value Ref Range    Glucose 92 65 - 99 mg/dL    BUN 15 8 - 23 mg/dL    Creatinine 0.87 0.57 - 1.00 mg/dL    Sodium 141 136 - 145 mmol/L    Potassium 4.8 3.5 - 5.2 mmol/L    Chloride 105 98 - 107 mmol/L    CO2 26.4 22.0 - 29.0 mmol/L    Calcium 10.4 8.6 - 10.5 mg/dL    Total Protein 6.7 6.0 - 8.5 g/dL    Albumin 4.5 3.5 - 5.2 g/dL    ALT (SGPT) 28 1 - 33 U/L    AST (SGOT) 28 1 - 32 U/L    Alkaline Phosphatase 124 (H) 39 - 117 U/L    Total Bilirubin 0.5 0.0 - 1.2 mg/dL    Globulin 2.2 gm/dL    A/G Ratio 2.0 g/dL    BUN/Creatinine Ratio 17.2 7.0 - 25.0    Anion Gap 9.6 5.0 - 15.0 mmol/L    eGFR 70.9 >60.0 mL/min/1.73   Lipid Panel    Specimen: Arm, Left; Blood   Result Value Ref Range    Total Cholesterol 223 (H) 0 - 200 mg/dL    Triglycerides 140 0 - 150 mg/dL    HDL Cholesterol 87 (H) 40 - 60 mg/dL    LDL Cholesterol  112 (H) 0 - 100 mg/dL    VLDL Cholesterol 24 5 - 40 mg/dL    LDL/HDL Ratio 1.24    TSH    Specimen: Arm, Left; Blood   Result Value Ref Range    TSH 0.256 (L) 0.270 - 4.200 uIU/mL   T4, Free    Specimen: Arm, Left; Blood   Result Value Ref Range    Free T4 1.27 0.93 - 1.70 ng/dL     Diagnoses and all orders for this visit:    1. Pure hypercholesterolemia (Primary)  Assessment & Plan:  Taking lipitor 10 mg daily   Check flp     Orders:  -     Comprehensive Metabolic Panel; Future  -     Lipid Panel; Future    2. Vitamin D deficiency  Assessment & Plan:  Continue supplement, update levels     Orders:  -     Vitamin D,25-Hydroxy; Future    3. Acquired hypothyroidism  Assessment & Plan:  Taking synthroid 88 mcg daily   Check tfts     Orders:  -     TSH; Future  -     T4, Free; Future    4. Gastroesophageal reflux disease with esophagitis without hemorrhage  Assessment & Plan:  Avoiding citrus, spicy and carbonated drinks  Using prilosec   No warning  symptoms  Testing if persistent       Return in about 6 months (around 2/5/2025) for Recheck.    Electronically signed by: Nela Kenyon MD

## 2024-08-05 NOTE — ASSESSMENT & PLAN NOTE
Avoiding citrus, spicy and carbonated drinks  Using prilosec   No warning symptoms  Testing if persistent

## 2024-08-12 ENCOUNTER — LAB (OUTPATIENT)
Dept: LAB | Facility: HOSPITAL | Age: 74
End: 2024-08-12
Payer: MEDICARE

## 2024-08-12 ENCOUNTER — OFFICE VISIT (OUTPATIENT)
Dept: INTERNAL MEDICINE | Facility: CLINIC | Age: 74
End: 2024-08-12
Payer: MEDICARE

## 2024-08-12 VITALS
WEIGHT: 135.6 LBS | BODY MASS INDEX: 23.15 KG/M2 | DIASTOLIC BLOOD PRESSURE: 80 MMHG | TEMPERATURE: 97.4 F | OXYGEN SATURATION: 95 % | HEIGHT: 64 IN | SYSTOLIC BLOOD PRESSURE: 124 MMHG | HEART RATE: 74 BPM

## 2024-08-12 DIAGNOSIS — E55.9 VITAMIN D DEFICIENCY: ICD-10-CM

## 2024-08-12 DIAGNOSIS — M85.80 OSTEOPENIA, UNSPECIFIED LOCATION: ICD-10-CM

## 2024-08-12 DIAGNOSIS — E21.0 PRIMARY HYPERPARATHYROIDISM: ICD-10-CM

## 2024-08-12 DIAGNOSIS — Z13.820 SCREENING FOR OSTEOPOROSIS: ICD-10-CM

## 2024-08-12 DIAGNOSIS — Z00.00 MEDICARE ANNUAL WELLNESS VISIT, SUBSEQUENT: Primary | ICD-10-CM

## 2024-08-12 DIAGNOSIS — E03.9 ACQUIRED HYPOTHYROIDISM: ICD-10-CM

## 2024-08-12 DIAGNOSIS — E78.00 PURE HYPERCHOLESTEROLEMIA: ICD-10-CM

## 2024-08-12 DIAGNOSIS — K21.00 GASTROESOPHAGEAL REFLUX DISEASE WITH ESOPHAGITIS WITHOUT HEMORRHAGE: ICD-10-CM

## 2024-08-12 LAB
DEPRECATED RDW RBC AUTO: 44.1 FL (ref 37–54)
ERYTHROCYTE [DISTWIDTH] IN BLOOD BY AUTOMATED COUNT: 12.6 % (ref 12.3–15.4)
HCT VFR BLD AUTO: 44.7 % (ref 34–46.6)
HGB BLD-MCNC: 15.1 G/DL (ref 12–15.9)
MCH RBC QN AUTO: 32.4 PG (ref 26.6–33)
MCHC RBC AUTO-ENTMCNC: 33.8 G/DL (ref 31.5–35.7)
MCV RBC AUTO: 95.9 FL (ref 79–97)
PLATELET # BLD AUTO: 239 10*3/MM3 (ref 140–450)
PMV BLD AUTO: 11.5 FL (ref 6–12)
RBC # BLD AUTO: 4.66 10*6/MM3 (ref 3.77–5.28)
WBC NRBC COR # BLD AUTO: 5.5 10*3/MM3 (ref 3.4–10.8)

## 2024-08-12 PROCEDURE — 36415 COLL VENOUS BLD VENIPUNCTURE: CPT | Performed by: NURSE PRACTITIONER

## 2024-08-12 PROCEDURE — G0439 PPPS, SUBSEQ VISIT: HCPCS | Performed by: NURSE PRACTITIONER

## 2024-08-12 PROCEDURE — 85027 COMPLETE CBC AUTOMATED: CPT | Performed by: NURSE PRACTITIONER

## 2024-08-12 PROCEDURE — 1126F AMNT PAIN NOTED NONE PRSNT: CPT | Performed by: NURSE PRACTITIONER

## 2024-08-12 RX ORDER — FAMOTIDINE 20 MG/1
20 TABLET, FILM COATED ORAL DAILY PRN
Qty: 90 TABLET | Refills: 3 | Status: SHIPPED | OUTPATIENT
Start: 2024-08-12

## 2024-08-12 RX ORDER — OMEPRAZOLE 20 MG/1
20 CAPSULE, DELAYED RELEASE ORAL DAILY
Qty: 90 CAPSULE | Refills: 3 | Status: SHIPPED | OUTPATIENT
Start: 2024-08-12

## 2024-08-12 RX ORDER — OMEPRAZOLE 20 MG/1
20 CAPSULE, DELAYED RELEASE ORAL DAILY
COMMUNITY
End: 2024-08-12

## 2024-08-12 NOTE — PROGRESS NOTES
The ABCs of the Annual Wellness Visit  Subsequent Medicare Wellness Visit    Chief Complaint   Patient presents with    Heartburn     Started having Acid reflux back inn April. Especially bad at night. Has elevated head of bed, cut out spicy food and has started Omeprazole 20mg daily    Medicare Wellness-subsequent      Subjective    History of Present Illness:  Beverley Yoo is a 74 y.o. female who presents for a Subsequent Medicare Wellness Visit.     GERD: Patient has been experiencing an increase in reflux recently.  She reports her reflux is normally worse at nighttime.  She has been taking omeprazole 20 mg daily for about 3 to 4 months now.  She does feel the omeprazole has been somewhat helpful.  She denies any intense burning in the mornings like she was experiencing previously.  She still has some discomfort throughout the day and a gravelly voice.  She is taking the medication first thing in the morning.  She is elevating the head of her bed at night.  She is avoiding spicy foods.  She is avoiding eating late at night.      She reports a good appetite.  She reports sleeping well.  She is not currently experiencing any falls.  She tries to be consistent with exercise and does Pilates 5 days weekly and walks regularly.  She reports her hyperlipidemia is familial.  She started a statin a few years ago.  She is tolerating the statin well.      The following portions of the patient's history were reviewed and   updated as appropriate: allergies, current medications, past family history, past medical history, past social history, past surgical history, and problem list.    Compared to one year ago, the patient feels her physical   health is better.    Compared to one year ago, the patient feels her mental   health is the same.    Recent Hospitalizations:  She was not admitted to the hospital during the last year.       Current Medical Providers:  Patient Care Team:  Shilpa Garrison APRN as PCP - General (Internal  Medicine)  Nela Kenyon MD as Consulting Physician (Endocrinology)    Outpatient Medications Prior to Visit   Medication Sig Dispense Refill    atorvastatin (LIPITOR) 10 MG tablet TAKE 1 TABLET DAILY 90 tablet 1    Calcium-Magnesium-Vitamin D (CALCIUM 500 PO) Take  by mouth 2 (Two) Times a Day.      Fish Oil-Cholecalciferol (OMEGA-3 + D PO) Take  by mouth 2 (Two) Times a Day.      Multiple Vitamins-Minerals (MULTIVITAMIN ADULTS PO) Take  by mouth.      Synthroid 88 MCG tablet Take 1 tablet by mouth Daily. 90 tablet 3    tretinoin (RETIN-A) 0.05 % cream ANALIA AA HS  11    estradiol (VAGIFEM) 10 MCG tablet vaginal tablet INSERT 1 TABLET VAGINALLY 2 times per week 45 tablet 1    omeprazole (priLOSEC) 20 MG capsule Take 1 capsule by mouth Daily.      valACYclovir (VALTREX) 500 MG tablet Take 1 tablet by mouth Daily.       No facility-administered medications prior to visit.       No opioid medication identified on active medication list. I have reviewed chart for other potential  high risk medication/s and harmful drug interactions in the elderly.        Aspirin is not on active medication list.  Aspirin use is not indicated based on review of current medical condition/s. Risk of harm outweighs potential benefits.  .    Patient Active Problem List   Diagnosis    Fibrocystic breast disease    Hypercalcemia    Hyperlipidemia    Hypothyroidism    Mammogram abnormal    Menopausal symptoms    Osteopenia    Seasonal allergies    Vitamin D deficiency    H/O colonoscopy    Hypocalcemia    Primary hyperparathyroidism    Acquired hallux rigidus of left foot    Plantar fascial fibromatosis of right foot    PAC (premature atrial contraction)    Urinary, incontinence, stress female    Pyriformis syndrome, unspecified laterality    Asymptomatic postmenopausal state    Left breast lump    Family history of glaucoma    Hypermetropia    Posterior vitreous detachment    Presbyopia    Regular astigmatism    Right buttock pain     "Age-related nuclear cataract of both eyes    Multiple benign melanocytic nevi    Ophthalmic migraine    Senile angioma    Seborrheic keratosis    Solar lentigo    Gastroesophageal reflux disease with esophagitis without hemorrhage     Advance Care Planning  Advance Directive is on file.  ACP discussion was held with the patient during this visit. Patient has an advance directive in EMR which is still valid.     Review of Systems   Gastrointestinal:         GERD        Objective    Vitals:    08/12/24 1136   BP: 124/80   BP Location: Left arm   Patient Position: Sitting   Cuff Size: Adult   Pulse: 74   Temp: 97.4 °F (36.3 °C)   TempSrc: Temporal   SpO2: 95%   Weight: 61.5 kg (135 lb 9.6 oz)   Height: 162.5 cm (63.98\")   PainSc: 0-No pain     Estimated body mass index is 23.29 kg/m² as calculated from the following:    Height as of this encounter: 162.5 cm (63.98\").    Weight as of this encounter: 61.5 kg (135 lb 9.6 oz).    BMI is within normal parameters. No other follow-up for BMI required.      Does the patient have evidence of cognitive impairment? No    Physical Exam  Constitutional:       General: She is not in acute distress.     Appearance: Normal appearance. She is not ill-appearing.   HENT:      Head: Normocephalic and atraumatic.      Nose: Nose normal.   Eyes:      Extraocular Movements: Extraocular movements intact.      Conjunctiva/sclera: Conjunctivae normal.      Pupils: Pupils are equal, round, and reactive to light.      Comments: Glasses in place   Cardiovascular:      Rate and Rhythm: Normal rate and regular rhythm.      Heart sounds: Normal heart sounds.   Pulmonary:      Effort: Pulmonary effort is normal. No respiratory distress.      Breath sounds: Normal breath sounds.   Musculoskeletal:         General: Normal range of motion.      Cervical back: Neck supple.      Right lower leg: No edema.      Left lower leg: No edema.   Skin:     General: Skin is warm and dry.   Neurological:      " General: No focal deficit present.      Mental Status: She is alert and oriented to person, place, and time. Mental status is at baseline.   Psychiatric:         Mood and Affect: Mood normal.         Behavior: Behavior normal.         Thought Content: Thought content normal.         Judgment: Judgment normal.       Lab Results   Component Value Date    TRIG 88 2024    HDL 76 (H) 2024     (H) 2024    VLDL 15 2024            HEALTH RISK ASSESSMENT    Smoking Status:  Social History     Tobacco Use   Smoking Status Never   Smokeless Tobacco Never     Alcohol Consumption:  Social History     Substance and Sexual Activity   Alcohol Use Yes    Alcohol/week: 3.0 standard drinks of alcohol    Types: 3 Glasses of wine per week    Comment: socially     Fall Risk Screen:    COREY Fall Risk Assessment was completed, and patient is at LOW risk for falls.Assessment completed on:2024    Depression Screenin/12/2024    11:40 AM   PHQ-2/PHQ-9 Depression Screening   Little Interest or Pleasure in Doing Things 0-->not at all   Feeling Down, Depressed or Hopeless 0-->not at all   PHQ-9: Brief Depression Severity Measure Score 0       Health Habits and Functional and Cognitive Screenin/12/2024     7:07 AM   Functional & Cognitive Status   Do you have difficulty preparing food and eating? No   Do you have difficulty bathing yourself, getting dressed or grooming yourself? No   Do you have difficulty using the toilet? No   Do you have difficulty moving around from place to place? No   Do you have trouble with steps or getting out of a bed or a chair? No   Current Diet Well Balanced Diet   Dental Exam Up to date   Eye Exam Up to date   Exercise (times per week) 10 times per week   Current Exercises Include Gardening;House Cleaning;Light Weights;Other;Pilates;Walking;Yard Work   Do you need help using the phone?  No   Are you deaf or do you have serious difficulty hearing?  No   Do you  need help to go to places out of walking distance? No   Do you need help shopping? No   Do you need help preparing meals?  No   Do you need help with housework?  No   Do you need help with laundry? No   Do you need help taking your medications? No   Do you need help managing money? No   Do you ever drive or ride in a car without wearing a seat belt? No   Have you felt unusual stress, anger or loneliness in the last month? No   Who do you live with? Spouse   If you need help, do you have trouble finding someone available to you? No   Have you been bothered in the last four weeks by sexual problems? No   Do you have difficulty concentrating, remembering or making decisions? No       Age-appropriate Screening Schedule:  Refer to the list below for future screening recommendations based on patient's age, sex and/or medical conditions. Orders for these recommended tests are listed in the plan section. The patient has been provided with a written plan.    Health Maintenance   Topic Date Due    DXA SCAN  07/28/2024    INFLUENZA VACCINE  08/01/2024    COLORECTAL CANCER SCREENING  08/18/2025 (Originally 1950)    LIPID PANEL  08/05/2025    ANNUAL WELLNESS VISIT  08/12/2025    MAMMOGRAM  09/06/2025    TDAP/TD VACCINES (3 - Td or Tdap) 01/25/2033    HEPATITIS C SCREENING  Completed    COVID-19 Vaccine  Completed    Pneumococcal Vaccine 65+  Completed    ZOSTER VACCINE  Completed    PAP SMEAR  Discontinued              Assessment & Plan   CMS Preventative Services Quick Reference  Risk Factors Identified During Encounter  None Identified  The above risks/problems have been discussed with the patient.  Follow up actions/plans if indicated are seen below in the Assessment/Plan Section.  Pertinent information has been shared with the patient in the After Visit Summary.    Diagnoses and all orders for this visit:    1. Medicare annual wellness visit, subsequent (Primary)    2. Gastroesophageal reflux disease with esophagitis  without hemorrhage  -     omeprazole (priLOSEC) 20 MG capsule; Take 1 capsule by mouth Daily.  Dispense: 90 capsule; Refill: 3  -     famotidine (Pepcid) 20 MG tablet; Take 1 tablet by mouth Daily As Needed for Heartburn.  Dispense: 90 tablet; Refill: 3  -     CBC (No Diff)    3. Pure hypercholesterolemia    4. Acquired hypothyroidism    5. Osteopenia, unspecified location  -     DEXA Bone Density Axial; Future    6. Vitamin D deficiency    7. Screening for osteoporosis  -     DEXA Bone Density Axial; Future    8. Primary hyperparathyroidism  -     DEXA Bone Density Axial; Future        Follow Up:   Return in about 1 year (around 8/12/2025) for Medicare Wellness.     An After Visit Summary and PPPS were made available to the patient.         Part of this note may be an electronic transcription/translation of spoken language to printed text using the Dragon Dictation System.

## 2024-09-24 DIAGNOSIS — E03.9 ACQUIRED HYPOTHYROIDISM: ICD-10-CM

## 2024-09-24 RX ORDER — LEVOTHYROXINE SODIUM 88 MCG
88 TABLET ORAL DAILY
Qty: 90 TABLET | Refills: 1 | Status: SHIPPED | OUTPATIENT
Start: 2024-09-24 | End: 2025-09-24

## 2024-09-26 ENCOUNTER — TELEPHONE (OUTPATIENT)
Dept: ENDOCRINOLOGY | Facility: CLINIC | Age: 74
End: 2024-09-26

## 2024-09-26 NOTE — TELEPHONE ENCOUNTER
I would talk to the patient first- most patients on brand name are on it because they do not want the generic.  I am happy to change if she would prefer generic medication  Thanks, irasema

## 2024-09-26 NOTE — TELEPHONE ENCOUNTER
[facility] calling about  Synthroid 88 MCG tablet     This is not covered, but generic equivalent is covered, wondering if they can change to this.    Can call back  [phone]  Ref  # [id number]

## 2024-09-26 NOTE — TELEPHONE ENCOUNTER
Called the pt and she stated she wants to stay on the brand. Called the ins and spoke to Mary. She started PA on CMM with [facility] [id number].

## 2024-09-30 DIAGNOSIS — E03.9 ACQUIRED HYPOTHYROIDISM: ICD-10-CM

## 2024-09-30 RX ORDER — LEVOTHYROXINE SODIUM 88 MCG
88 TABLET ORAL DAILY
Qty: 90 TABLET | Refills: 1 | Status: CANCELLED | OUTPATIENT
Start: 2024-09-30 | End: 2025-09-30

## 2024-10-01 ENCOUNTER — TELEPHONE (OUTPATIENT)
Dept: ENDOCRINOLOGY | Facility: CLINIC | Age: 74
End: 2024-10-01
Payer: MEDICARE

## 2024-10-01 DIAGNOSIS — E03.9 ACQUIRED HYPOTHYROIDISM: ICD-10-CM

## 2024-10-01 NOTE — TELEPHONE ENCOUNTER
Pt called was checking on status on PA for synthroid 88 mcg. As of 09/30/24 PA still pending with insurance. See message from 09/30/24. Also pt was inquiring about synthroid company for prescription if this is a route she can go. Please contact pt

## 2024-10-02 RX ORDER — LEVOTHYROXINE SODIUM 88 MCG
88 TABLET ORAL DAILY
Qty: 30 TABLET | Refills: 0 | Status: SHIPPED | OUTPATIENT
Start: 2024-10-02 | End: 2025-10-02

## 2024-10-02 NOTE — TELEPHONE ENCOUNTER
UPDATE: I HAVE CONTACTED [facility] TO VERIFY FAX NUMBER. FAX WE HAVE ON FILE FOR PA'S IS INCORRECT, PLEASE SEND PA [phone]. PLEASE CALL TO CONFIRM WITH PT WHEN PA SENT.

## 2024-10-07 ENCOUNTER — HOSPITAL ENCOUNTER (OUTPATIENT)
Dept: BONE DENSITY | Facility: HOSPITAL | Age: 74
Discharge: HOME | End: 2024-10-07
Admitting: NURSE PRACTITIONER
Payer: MEDICARE

## 2024-10-07 ENCOUNTER — TRANSCRIBE ORDERS (OUTPATIENT)
Dept: INTERNAL MEDICINE | Facility: CLINIC | Age: 74
End: 2024-10-07
Payer: MEDICARE

## 2024-10-07 DIAGNOSIS — E21.0 PRIMARY HYPERPARATHYROIDISM: ICD-10-CM

## 2024-10-07 DIAGNOSIS — Z13.820 SCREENING FOR OSTEOPOROSIS: ICD-10-CM

## 2024-10-07 DIAGNOSIS — M85.80 OSTEOPENIA, UNSPECIFIED LOCATION: ICD-10-CM

## 2024-10-07 DIAGNOSIS — Z12.31 ENCOUNTER FOR SCREENING MAMMOGRAM FOR BREAST CANCER: Primary | ICD-10-CM

## 2024-10-07 PROCEDURE — 77080 DXA BONE DENSITY AXIAL: CPT

## 2024-11-05 ENCOUNTER — HOSPITAL ENCOUNTER (OUTPATIENT)
Dept: MAMMOGRAPHY | Facility: HOSPITAL | Age: 74
Discharge: HOME | End: 2024-11-05
Admitting: NURSE PRACTITIONER
Payer: MEDICARE

## 2024-11-05 DIAGNOSIS — Z12.31 ENCOUNTER FOR SCREENING MAMMOGRAM FOR BREAST CANCER: ICD-10-CM

## 2024-11-05 LAB
NCCN CRITERIA FLAG: ABNORMAL
TYRER CUZICK SCORE: 5.8

## 2024-11-05 PROCEDURE — 77067 SCR MAMMO BI INCL CAD: CPT

## 2024-11-05 PROCEDURE — 77063 BREAST TOMOSYNTHESIS BI: CPT

## 2024-11-05 NOTE — PROGRESS NOTES
This patient recently took the CARE risk assessment as part of their mammogram appointment. Based on the patient's responses, NCCN criteria for genetic testing was met.     Navigator follow-up:   The patient had genetic counseling and genetic testing in 2021.  The [email or web address] Common Hereditary Cancers panel was ordered which analyzes 47 genes associated with an increased cancer risk. Genetic testing was negative for pathogenic mutations in BRCA1/2 and 45 additional genes included on the Common Hereditary Cancers panel. No additional testing is indicated at this time.

## 2024-11-08 DIAGNOSIS — E03.9 ACQUIRED HYPOTHYROIDISM: ICD-10-CM

## 2024-11-08 NOTE — TELEPHONE ENCOUNTER
Rx Refill Note  Requested Prescriptions     Pending Prescriptions Disp Refills    Synthroid 88 MCG tablet 30 tablet 0     Sig: Take 1 tablet by mouth Daily.      Last office visit with prescribing clinician: 8/5/2024     Next office visit with prescribing clinician: 2/6/2025

## 2024-11-11 RX ORDER — LEVOTHYROXINE SODIUM 88 MCG
88 TABLET ORAL DAILY
Qty: 90 TABLET | Refills: 0 | Status: SHIPPED | OUTPATIENT
Start: 2024-11-11

## 2024-11-22 DIAGNOSIS — Z79.890 HORMONE REPLACEMENT THERAPY: ICD-10-CM

## 2024-11-22 DIAGNOSIS — Z78.0 POSTMENOPAUSAL: Primary | ICD-10-CM

## 2024-12-06 ENCOUNTER — PRIOR AUTHORIZATION (OUTPATIENT)
Dept: INTERNAL MEDICINE | Facility: CLINIC | Age: 74
End: 2024-12-06
Payer: MEDICARE

## 2024-12-06 DIAGNOSIS — Z79.890 HORMONE REPLACEMENT THERAPY: ICD-10-CM

## 2024-12-06 DIAGNOSIS — Z78.0 POSTMENOPAUSAL: Primary | ICD-10-CM

## 2024-12-06 RX ORDER — ESTRADIOL 0.1 MG/G
2 CREAM VAGINAL DAILY
Qty: 42.5 G | Refills: 5 | Status: SHIPPED | OUTPATIENT
Start: 2024-12-06

## 2024-12-06 NOTE — TELEPHONE ENCOUNTER
Part D pharmacy benefit     [facility] [id number]  [facility] [employer]  ID [id number]  Group [id number]  Phone number [phone]

## 2024-12-06 NOTE — TELEPHONE ENCOUNTER
Insurance will cover estradiol vaginal cream. I have contacted patient and she verbalized consent in trying different medication.

## 2024-12-20 DIAGNOSIS — M25.569 KNEE PAIN, UNSPECIFIED CHRONICITY, UNSPECIFIED LATERALITY: ICD-10-CM

## 2024-12-20 DIAGNOSIS — M25.559 HIP PAIN, UNSPECIFIED LATERALITY: ICD-10-CM

## 2024-12-20 DIAGNOSIS — M54.9 BACK PAIN, UNSPECIFIED BACK LOCATION, UNSPECIFIED BACK PAIN LATERALITY, UNSPECIFIED CHRONICITY: Primary | ICD-10-CM

## 2025-02-06 ENCOUNTER — OFFICE VISIT (OUTPATIENT)
Dept: ENDOCRINOLOGY | Facility: CLINIC | Age: 75
End: 2025-02-06
Payer: MEDICARE

## 2025-02-06 VITALS
HEART RATE: 74 BPM | WEIGHT: 123.8 LBS | BODY MASS INDEX: 21.13 KG/M2 | SYSTOLIC BLOOD PRESSURE: 122 MMHG | HEIGHT: 64 IN | OXYGEN SATURATION: 96 % | DIASTOLIC BLOOD PRESSURE: 70 MMHG

## 2025-02-06 DIAGNOSIS — E78.00 PURE HYPERCHOLESTEROLEMIA: Primary | ICD-10-CM

## 2025-02-06 DIAGNOSIS — E03.9 ACQUIRED HYPOTHYROIDISM: ICD-10-CM

## 2025-02-06 DIAGNOSIS — E55.9 VITAMIN D DEFICIENCY: ICD-10-CM

## 2025-02-06 LAB
25(OH)D3 SERPL-MCNC: 56.3 NG/ML (ref 30–100)
ALBUMIN SERPL-MCNC: 4.3 G/DL (ref 3.5–5.2)
ALBUMIN/GLOB SERPL: 1.4 G/DL
ALP SERPL-CCNC: 113 U/L (ref 39–117)
ALT SERPL W P-5'-P-CCNC: 18 U/L (ref 1–33)
ANION GAP SERPL CALCULATED.3IONS-SCNC: 10.1 MMOL/L (ref 5–15)
AST SERPL-CCNC: 29 U/L (ref 1–32)
BILIRUB SERPL-MCNC: 0.3 MG/DL (ref 0–1.2)
BUN SERPL-MCNC: 14 MG/DL (ref 8–23)
BUN/CREAT SERPL: 16.3 (ref 7–25)
CALCIUM SPEC-SCNC: 10.2 MG/DL (ref 8.6–10.5)
CHLORIDE SERPL-SCNC: 104 MMOL/L (ref 98–107)
CHOLEST SERPL-MCNC: 334 MG/DL (ref 0–200)
CO2 SERPL-SCNC: 25.9 MMOL/L (ref 22–29)
CREAT SERPL-MCNC: 0.86 MG/DL (ref 0.57–1)
EGFRCR SERPLBLD CKD-EPI 2021: 71.4 ML/MIN/1.73
GLOBULIN UR ELPH-MCNC: 3.1 GM/DL
GLUCOSE SERPL-MCNC: 79 MG/DL (ref 65–99)
HDLC SERPL-MCNC: 100 MG/DL (ref 40–60)
LDLC SERPL CALC-MCNC: 221 MG/DL (ref 0–100)
LDLC/HDLC SERPL: 2.17 {RATIO}
POTASSIUM SERPL-SCNC: 4.3 MMOL/L (ref 3.5–5.2)
PROT SERPL-MCNC: 7.4 G/DL (ref 6–8.5)
SODIUM SERPL-SCNC: 140 MMOL/L (ref 136–145)
T4 FREE SERPL-MCNC: 1.43 NG/DL (ref 0.92–1.68)
TRIGL SERPL-MCNC: 84 MG/DL (ref 0–150)
TSH SERPL DL<=0.05 MIU/L-ACNC: 1.22 UIU/ML (ref 0.27–4.2)
VLDLC SERPL-MCNC: 13 MG/DL (ref 5–40)

## 2025-02-06 PROCEDURE — 80053 COMPREHEN METABOLIC PANEL: CPT | Performed by: INTERNAL MEDICINE

## 2025-02-06 PROCEDURE — 1159F MED LIST DOCD IN RCRD: CPT | Performed by: INTERNAL MEDICINE

## 2025-02-06 PROCEDURE — 80061 LIPID PANEL: CPT | Performed by: INTERNAL MEDICINE

## 2025-02-06 PROCEDURE — 84439 ASSAY OF FREE THYROXINE: CPT | Performed by: INTERNAL MEDICINE

## 2025-02-06 PROCEDURE — 1160F RVW MEDS BY RX/DR IN RCRD: CPT | Performed by: INTERNAL MEDICINE

## 2025-02-06 PROCEDURE — 99214 OFFICE O/P EST MOD 30 MIN: CPT | Performed by: INTERNAL MEDICINE

## 2025-02-06 PROCEDURE — 84443 ASSAY THYROID STIM HORMONE: CPT | Performed by: INTERNAL MEDICINE

## 2025-02-06 PROCEDURE — 82306 VITAMIN D 25 HYDROXY: CPT | Performed by: INTERNAL MEDICINE

## 2025-02-06 NOTE — PROGRESS NOTES
Beverley Yoo 74 y.o.  CC: follow up Hypothyroid, hyperlipidemia    Kake: follow up Hypothyroid, hyperlipidemia    Weight loss   A lot of home stress  Energy good overall   Taking synthroid 88 mcg daily   Is eating low fat diet, taking lipitor 10 mg daily     No Known Allergies    Current Outpatient Medications:     atorvastatin (LIPITOR) 10 MG tablet, TAKE 1 TABLET DAILY, Disp: 90 tablet, Rfl: 1    Calcium-Magnesium-Vitamin D (CALCIUM 500 PO), Take  by mouth 2 (Two) Times a Day., Disp: , Rfl:     estradiol (ESTRACE) 0.1 MG/GM vaginal cream, Insert 2 g into the vagina Daily., Disp: 42.5 g, Rfl: 5    famotidine (Pepcid) 20 MG tablet, Take 1 tablet by mouth Daily As Needed for Heartburn., Disp: 90 tablet, Rfl: 3    Fish Oil-Cholecalciferol (OMEGA-3 + D PO), Take  by mouth 2 (Two) Times a Day., Disp: , Rfl:     Multiple Vitamins-Minerals (MULTIVITAMIN ADULTS PO), Take  by mouth., Disp: , Rfl:     omeprazole (priLOSEC) 20 MG capsule, Take 1 capsule by mouth Daily., Disp: 90 capsule, Rfl: 3    Synthroid 88 MCG tablet, Take 1 tablet by mouth Daily., Disp: 90 tablet, Rfl: 0  Patient Active Problem List    Diagnosis     Gastroesophageal reflux disease with esophagitis without hemorrhage [K21.00]     Multiple benign melanocytic nevi [D22.9]     Senile angioma [I78.1]     Seborrheic keratosis [L82.1]     Solar lentigo [L81.4]     Age-related nuclear cataract of both eyes [H25.13]     Ophthalmic migraine [G43.109]     Right buttock pain [M79.18]     Family history of glaucoma [Z83.511]     Hypermetropia [H52.00]     Posterior vitreous detachment [H43.819]     Presbyopia [H52.4]     Regular astigmatism [H52.229]     Left breast lump [N63.20]     Asymptomatic postmenopausal state [Z78.0]     Urinary, incontinence, stress female [N39.3]     Pyriformis syndrome, unspecified laterality [G57.00]     PAC (premature atrial contraction) [I49.1]     Acquired hallux rigidus of left foot [M20.22]     Plantar fascial fibromatosis of right  foot [M72.2]     Primary hyperparathyroidism [E21.0]     H/O colonoscopy [Z98.890]     Hypocalcemia [E83.51]     Menopausal symptoms [N95.1]     Osteopenia [M85.80]     Seasonal allergies [J30.2]     Vitamin D deficiency [E55.9]     Fibrocystic breast disease [N60.19]     Hypercalcemia [E83.52]     Hyperlipidemia [E78.5]     Hypothyroidism [E03.9]     Mammogram abnormal [R92.8]      Review of Systems   Constitutional:  Positive for appetite change and unexpected weight change. Negative for activity change.   HENT:  Negative for congestion and rhinorrhea.    Eyes:  Negative for visual disturbance.   Respiratory:  Negative for cough and shortness of breath.    Cardiovascular:  Negative for palpitations and leg swelling.   Gastrointestinal:  Negative for constipation, diarrhea and nausea.   Genitourinary:  Negative for hematuria.   Musculoskeletal:  Negative for arthralgias, back pain, gait problem, joint swelling and myalgias.   Skin:  Negative for color change, rash and wound.   Allergic/Immunologic: Negative for environmental allergies, food allergies and immunocompromised state.   Neurological:  Negative for dizziness, weakness and light-headedness.   Psychiatric/Behavioral:  Negative for confusion, decreased concentration, dysphoric mood and sleep disturbance. The patient is not nervous/anxious.      Social History     Socioeconomic History    Marital status:    Tobacco Use    Smoking status: Never    Smokeless tobacco: Never   Vaping Use    Vaping status: Never Used   Substance and Sexual Activity    Alcohol use: Yes     Alcohol/week: 3.0 standard drinks of alcohol     Types: 3 Glasses of wine per week     Comment: socially    Drug use: No    Sexual activity: Yes     Partners: Male     Birth control/protection: Surgical, Post-menopausal     Family History   Problem Relation Age of Onset    Cancer Paternal Grandfather         BREAST     Breast cancer Mother 60    Cancer Mother         Breast CA    Thyroid  "disease Mother     Breast cancer Sister 60    Breast cancer Maternal Grandmother 60    Cancer Maternal Grandmother         Breast CA    Cancer Father     Anemia Father     Hyperlipidemia Father     Cancer Sister         Breast Cancer    Ovarian cancer Neg Hx     Endometrial cancer Neg Hx      /70 (BP Location: Right arm, Patient Position: Sitting, Cuff Size: Adult)   Pulse 74   Ht 162.5 cm (63.98\")   Wt 56.2 kg (123 lb 12.8 oz)   SpO2 96%   BMI 21.26 kg/m²   Physical Exam  Vitals and nursing note reviewed.   Constitutional:       Appearance: Normal appearance. She is well-developed.   HENT:      Head: Normocephalic and atraumatic.   Eyes:      General: Lids are normal.      Extraocular Movements: Extraocular movements intact.      Conjunctiva/sclera: Conjunctivae normal.      Pupils: Pupils are equal, round, and reactive to light.   Neck:      Thyroid: No thyroid mass or thyromegaly.      Vascular: No carotid bruit.      Trachea: Trachea normal. No tracheal deviation.   Cardiovascular:      Rate and Rhythm: Normal rate and regular rhythm.      Pulses: Normal pulses.      Heart sounds: Normal heart sounds. No murmur heard.     No friction rub. No gallop.   Pulmonary:      Effort: Pulmonary effort is normal. No respiratory distress.      Breath sounds: Normal breath sounds. No wheezing.   Musculoskeletal:         General: No deformity. Normal range of motion.      Cervical back: Normal range of motion and neck supple.   Lymphadenopathy:      Cervical: No cervical adenopathy.   Skin:     General: Skin is warm and dry.      Findings: No erythema or rash.      Nails: There is no clubbing.   Neurological:      General: No focal deficit present.      Mental Status: She is alert and oriented to person, place, and time.      Cranial Nerves: No cranial nerve deficit.      Deep Tendon Reflexes: Reflexes are normal and symmetric. Reflexes normal.   Psychiatric:         Mood and Affect: Mood normal.         Speech: " Speech normal.         Behavior: Behavior normal.         Thought Content: Thought content normal.         Judgment: Judgment normal.       Results for orders placed or performed in visit on 11/05/24   [facility] GENETIC RISK ASSESSMENT QUESTIONNAIRE - ,    Collection Time: 11/05/24  7:05 AM   Result Value Ref Range    Eve 5.8     NCCN NCCN met (A)      Diagnoses and all orders for this visit:    1. Pure hypercholesterolemia (Primary)  Assessment & Plan:  Taking lipitor 10 mg daily   Check flp     Orders:  -     Comprehensive Metabolic Panel; Future  -     Lipid Panel; Future    2. Acquired hypothyroidism  Assessment & Plan:  Taking synthroid 88 mcg daily   Check tfts     Orders:  -     TSH; Future  -     T4, Free; Future    3. Vitamin D deficiency  Assessment & Plan:  Continue supplement, update levels     Orders:  -     Vitamin D,25-Hydroxy; Future    Return in about 6 months (around 8/6/2025) for Recheck.    Electronically signed by: Nela Kenyon MD

## 2025-04-22 DIAGNOSIS — E03.9 ACQUIRED HYPOTHYROIDISM: ICD-10-CM

## 2025-04-23 NOTE — TELEPHONE ENCOUNTER
Rx Refill Note  Requested Prescriptions     Pending Prescriptions Disp Refills    Synthroid 88 MCG tablet [Pharmacy Med Name: SYNTHROID TABS 88MCG] 90 tablet 3     Sig: TAKE 1 TABLET DAILY      Last office visit with prescribing clinician: 2/6/2025     Next office visit with prescribing clinician: 8/19/2025     Brooklyn Patel MA  04/23/25, 09:29 EDT

## 2025-04-23 NOTE — TELEPHONE ENCOUNTER
Rx Refill Note  Requested Prescriptions     Pending Prescriptions Disp Refills    atorvastatin (LIPITOR) 10 MG tablet [Pharmacy Med Name: ATORVASTATIN TABS 10MG] 90 tablet 3     Sig: TAKE 1 TABLET DAILY      Last office visit with prescribing clinician: Visit date not found     Next office visit with prescribing clinician: 8/19/2025    Brooklyn Patel MA  04/23/25, 09:18 EDT

## 2025-04-24 RX ORDER — ATORVASTATIN CALCIUM 10 MG/1
10 TABLET, FILM COATED ORAL DAILY
Qty: 90 TABLET | Refills: 1 | Status: SHIPPED | OUTPATIENT
Start: 2025-04-24

## 2025-04-24 RX ORDER — LEVOTHYROXINE SODIUM 88 MCG
88 TABLET ORAL DAILY
Qty: 90 TABLET | Refills: 1 | Status: SHIPPED | OUTPATIENT
Start: 2025-04-24